# Patient Record
Sex: FEMALE | Race: BLACK OR AFRICAN AMERICAN | Employment: OTHER | URBAN - METROPOLITAN AREA
[De-identification: names, ages, dates, MRNs, and addresses within clinical notes are randomized per-mention and may not be internally consistent; named-entity substitution may affect disease eponyms.]

---

## 2017-03-08 PROBLEM — E11.9 TYPE 2 DIABETES MELLITUS WITHOUT COMPLICATION, WITHOUT LONG-TERM CURRENT USE OF INSULIN (HCC): Status: ACTIVE | Noted: 2017-03-08

## 2018-01-18 PROCEDURE — 84156 ASSAY OF PROTEIN URINE: CPT | Performed by: INTERNAL MEDICINE

## 2018-01-18 PROCEDURE — 82043 UR ALBUMIN QUANTITATIVE: CPT | Performed by: INTERNAL MEDICINE

## 2018-01-18 PROCEDURE — 36415 COLL VENOUS BLD VENIPUNCTURE: CPT | Performed by: INTERNAL MEDICINE

## 2018-01-18 PROCEDURE — 82570 ASSAY OF URINE CREATININE: CPT | Performed by: INTERNAL MEDICINE

## 2018-01-18 PROCEDURE — 81001 URINALYSIS AUTO W/SCOPE: CPT | Performed by: INTERNAL MEDICINE

## 2018-02-08 PROBLEM — E78.5 DYSLIPIDEMIA: Status: ACTIVE | Noted: 2018-02-08

## 2018-09-28 PROCEDURE — 82043 UR ALBUMIN QUANTITATIVE: CPT | Performed by: INTERNAL MEDICINE

## 2018-09-28 PROCEDURE — 82570 ASSAY OF URINE CREATININE: CPT | Performed by: INTERNAL MEDICINE

## 2018-11-05 PROBLEM — E11.9 TYPE 2 DIABETES MELLITUS WITHOUT COMPLICATION, WITHOUT LONG-TERM CURRENT USE OF INSULIN (HCC): Status: RESOLVED | Noted: 2017-03-08 | Resolved: 2018-11-05

## 2019-05-28 PROCEDURE — 81001 URINALYSIS AUTO W/SCOPE: CPT | Performed by: INTERNAL MEDICINE

## 2019-10-10 PROCEDURE — 87070 CULTURE OTHR SPECIMN AEROBIC: CPT | Performed by: ORTHOPAEDIC SURGERY

## 2019-10-10 PROCEDURE — 87186 SC STD MICRODIL/AGAR DIL: CPT | Performed by: ORTHOPAEDIC SURGERY

## 2019-10-10 PROCEDURE — 87075 CULTR BACTERIA EXCEPT BLOOD: CPT | Performed by: ORTHOPAEDIC SURGERY

## 2019-10-10 PROCEDURE — 87205 SMEAR GRAM STAIN: CPT | Performed by: ORTHOPAEDIC SURGERY

## 2019-10-10 PROCEDURE — 87076 CULTURE ANAEROBE IDENT EACH: CPT | Performed by: ORTHOPAEDIC SURGERY

## 2019-10-10 PROCEDURE — 87077 CULTURE AEROBIC IDENTIFY: CPT | Performed by: ORTHOPAEDIC SURGERY

## 2019-10-11 ENCOUNTER — HOSPITAL ENCOUNTER (INPATIENT)
Facility: HOSPITAL | Age: 64
LOS: 6 days | Discharge: NURSING, REHAB OR LONG-TERM CARE | DRG: 467 | End: 2019-10-17
Attending: EMERGENCY MEDICINE | Admitting: HOSPITALIST
Payer: COMMERCIAL

## 2019-10-11 ENCOUNTER — APPOINTMENT (OUTPATIENT)
Dept: GENERAL RADIOLOGY | Facility: HOSPITAL | Age: 64
DRG: 467 | End: 2019-10-11
Attending: ORTHOPAEDIC SURGERY
Payer: COMMERCIAL

## 2019-10-11 DIAGNOSIS — Z96.641 HISTORY OF TOTAL RIGHT HIP ARTHROPLASTY: ICD-10-CM

## 2019-10-11 DIAGNOSIS — T84.50XA: ICD-10-CM

## 2019-10-11 DIAGNOSIS — T81.40XA POSTOPERATIVE INFECTION, UNSPECIFIED TYPE, INITIAL ENCOUNTER: Primary | ICD-10-CM

## 2019-10-11 PROCEDURE — 96366 THER/PROPH/DIAG IV INF ADDON: CPT

## 2019-10-11 PROCEDURE — 87040 BLOOD CULTURE FOR BACTERIA: CPT | Performed by: EMERGENCY MEDICINE

## 2019-10-11 PROCEDURE — 73502 X-RAY EXAM HIP UNI 2-3 VIEWS: CPT | Performed by: ORTHOPAEDIC SURGERY

## 2019-10-11 PROCEDURE — 36415 COLL VENOUS BLD VENIPUNCTURE: CPT

## 2019-10-11 PROCEDURE — 82962 GLUCOSE BLOOD TEST: CPT

## 2019-10-11 PROCEDURE — 96365 THER/PROPH/DIAG IV INF INIT: CPT

## 2019-10-11 PROCEDURE — 85025 COMPLETE CBC W/AUTO DIFF WBC: CPT | Performed by: EMERGENCY MEDICINE

## 2019-10-11 PROCEDURE — 80048 BASIC METABOLIC PNL TOTAL CA: CPT | Performed by: EMERGENCY MEDICINE

## 2019-10-11 PROCEDURE — 85025 COMPLETE CBC W/AUTO DIFF WBC: CPT

## 2019-10-11 PROCEDURE — 99285 EMERGENCY DEPT VISIT HI MDM: CPT

## 2019-10-11 PROCEDURE — 96368 THER/DIAG CONCURRENT INF: CPT

## 2019-10-11 RX ORDER — DOCUSATE SODIUM 100 MG/1
100 CAPSULE, LIQUID FILLED ORAL 2 TIMES DAILY
Status: DISCONTINUED | OUTPATIENT
Start: 2019-10-11 | End: 2019-10-17

## 2019-10-11 RX ORDER — MORPHINE SULFATE 2 MG/ML
2 INJECTION, SOLUTION INTRAMUSCULAR; INTRAVENOUS EVERY 2 HOUR PRN
Status: DISCONTINUED | OUTPATIENT
Start: 2019-10-11 | End: 2019-10-13

## 2019-10-11 RX ORDER — MORPHINE SULFATE 2 MG/ML
1 INJECTION, SOLUTION INTRAMUSCULAR; INTRAVENOUS EVERY 2 HOUR PRN
Status: DISCONTINUED | OUTPATIENT
Start: 2019-10-11 | End: 2019-10-13

## 2019-10-11 RX ORDER — BISACODYL 10 MG
10 SUPPOSITORY, RECTAL RECTAL
Status: DISCONTINUED | OUTPATIENT
Start: 2019-10-11 | End: 2019-10-17

## 2019-10-11 RX ORDER — POLYETHYLENE GLYCOL 3350 17 G/17G
17 POWDER, FOR SOLUTION ORAL DAILY PRN
Status: DISCONTINUED | OUTPATIENT
Start: 2019-10-11 | End: 2019-10-17

## 2019-10-11 RX ORDER — ACETAMINOPHEN 325 MG/1
650 TABLET ORAL EVERY 6 HOURS PRN
Status: DISCONTINUED | OUTPATIENT
Start: 2019-10-11 | End: 2019-10-17

## 2019-10-11 RX ORDER — SODIUM CHLORIDE 0.9 % (FLUSH) 0.9 %
3 SYRINGE (ML) INJECTION AS NEEDED
Status: DISCONTINUED | OUTPATIENT
Start: 2019-10-11 | End: 2019-10-17

## 2019-10-11 RX ORDER — DEXTROSE MONOHYDRATE 25 G/50ML
50 INJECTION, SOLUTION INTRAVENOUS AS NEEDED
Status: DISCONTINUED | OUTPATIENT
Start: 2019-10-11 | End: 2019-10-17

## 2019-10-11 RX ORDER — SODIUM CHLORIDE 9 MG/ML
INJECTION, SOLUTION INTRAVENOUS CONTINUOUS
Status: DISCONTINUED | OUTPATIENT
Start: 2019-10-11 | End: 2019-10-14

## 2019-10-11 RX ORDER — ONDANSETRON 2 MG/ML
4 INJECTION INTRAMUSCULAR; INTRAVENOUS EVERY 6 HOURS PRN
Status: DISCONTINUED | OUTPATIENT
Start: 2019-10-11 | End: 2019-10-17

## 2019-10-11 RX ORDER — METOCLOPRAMIDE HYDROCHLORIDE 5 MG/ML
10 INJECTION INTRAMUSCULAR; INTRAVENOUS EVERY 8 HOURS PRN
Status: DISCONTINUED | OUTPATIENT
Start: 2019-10-11 | End: 2019-10-17

## 2019-10-11 RX ORDER — HYDROCODONE BITARTRATE AND ACETAMINOPHEN 10; 325 MG/1; MG/1
1 TABLET ORAL EVERY 4 HOURS PRN
Status: DISCONTINUED | OUTPATIENT
Start: 2019-10-11 | End: 2019-10-13

## 2019-10-11 NOTE — ED INITIAL ASSESSMENT (HPI)
Patient sent to ED for evaluation. Patient reports blood tests yesterday were abnormal and pmd was concerned for \"blood infection\". Patient had right hip surgery 2 weeks ago. Patient complains of feeling fatigue and weakness.

## 2019-10-11 NOTE — ED PROVIDER NOTES
Patient Seen in: [facility] AND [facility] Emergency Department      History   Patient presents with:  Abnormal Result (metabolic, cardiac)    Stated Complaint: post op hip surgery issue    HPI    59-year-old female with history of hypertension, diabetes, and st Resp 20   Temp 98.2 °F (36.8 °C)   Temp src Temporal   SpO2 99 %   O2 Device None (Room air)       Current:/66   Pulse 109   Temp 98.2 °F (36.8 °C) (Temporal)   Resp 16   Ht 167.6 cm (5' 6\")   Wt 78.9 kg   LMP  (LMP Unknown)   SpO2 99%   BMI 28.08 result                 Please view results for these tests on the individual orders.    URINALYSIS WITH CULTURE REFLEX   RAINBOW DRAW BLUE   RAINBOW DRAW LAVENDER   RAINBOW DRAW LIGHT GREEN   RAINBOW DRAW GOLD   BLOOD CULTURE   BLOOD CULTURE

## 2019-10-12 ENCOUNTER — APPOINTMENT (OUTPATIENT)
Dept: GENERAL RADIOLOGY | Facility: HOSPITAL | Age: 64
DRG: 467 | End: 2019-10-12
Attending: HOSPITALIST
Payer: COMMERCIAL

## 2019-10-12 ENCOUNTER — APPOINTMENT (OUTPATIENT)
Dept: GENERAL RADIOLOGY | Facility: HOSPITAL | Age: 64
DRG: 467 | End: 2019-10-12
Attending: EMERGENCY MEDICINE
Payer: COMMERCIAL

## 2019-10-12 PROCEDURE — 80053 COMPREHEN METABOLIC PANEL: CPT | Performed by: HOSPITALIST

## 2019-10-12 PROCEDURE — 93005 ELECTROCARDIOGRAM TRACING: CPT

## 2019-10-12 PROCEDURE — 97110 THERAPEUTIC EXERCISES: CPT

## 2019-10-12 PROCEDURE — 87077 CULTURE AEROBIC IDENTIFY: CPT | Performed by: ORTHOPAEDIC SURGERY

## 2019-10-12 PROCEDURE — 89050 BODY FLUID CELL COUNT: CPT | Performed by: ORTHOPAEDIC SURGERY

## 2019-10-12 PROCEDURE — 77002 NEEDLE LOCALIZATION BY XRAY: CPT | Performed by: EMERGENCY MEDICINE

## 2019-10-12 PROCEDURE — 89051 BODY FLUID CELL COUNT: CPT | Performed by: ORTHOPAEDIC SURGERY

## 2019-10-12 PROCEDURE — 83036 HEMOGLOBIN GLYCOSYLATED A1C: CPT | Performed by: HOSPITALIST

## 2019-10-12 PROCEDURE — 97162 PT EVAL MOD COMPLEX 30 MIN: CPT

## 2019-10-12 PROCEDURE — 20610 DRAIN/INJ JOINT/BURSA W/O US: CPT | Performed by: EMERGENCY MEDICINE

## 2019-10-12 PROCEDURE — 87186 SC STD MICRODIL/AGAR DIL: CPT | Performed by: ORTHOPAEDIC SURGERY

## 2019-10-12 PROCEDURE — 0S993ZX DRAINAGE OF RIGHT HIP JOINT, PERCUTANEOUS APPROACH, DIAGNOSTIC: ICD-10-PCS | Performed by: RADIOLOGY

## 2019-10-12 PROCEDURE — 89060 EXAM SYNOVIAL FLUID CRYSTALS: CPT | Performed by: ORTHOPAEDIC SURGERY

## 2019-10-12 PROCEDURE — 93010 ELECTROCARDIOGRAM REPORT: CPT | Performed by: HOSPITALIST

## 2019-10-12 PROCEDURE — 81001 URINALYSIS AUTO W/SCOPE: CPT | Performed by: HOSPITALIST

## 2019-10-12 PROCEDURE — 87184 SC STD DISK METHOD PER PLATE: CPT | Performed by: ORTHOPAEDIC SURGERY

## 2019-10-12 PROCEDURE — 87086 URINE CULTURE/COLONY COUNT: CPT | Performed by: HOSPITALIST

## 2019-10-12 PROCEDURE — 87205 SMEAR GRAM STAIN: CPT | Performed by: ORTHOPAEDIC SURGERY

## 2019-10-12 PROCEDURE — 82962 GLUCOSE BLOOD TEST: CPT

## 2019-10-12 PROCEDURE — 71045 X-RAY EXAM CHEST 1 VIEW: CPT | Performed by: HOSPITALIST

## 2019-10-12 PROCEDURE — 85025 COMPLETE CBC W/AUTO DIFF WBC: CPT | Performed by: HOSPITALIST

## 2019-10-12 PROCEDURE — 87070 CULTURE OTHR SPECIMN AEROBIC: CPT | Performed by: ORTHOPAEDIC SURGERY

## 2019-10-12 RX ORDER — LIDOCAINE HYDROCHLORIDE 10 MG/ML
INJECTION, SOLUTION EPIDURAL; INFILTRATION; INTRACAUDAL; PERINEURAL
Status: COMPLETED
Start: 2019-10-12 | End: 2019-10-12

## 2019-10-12 RX ORDER — ATORVASTATIN CALCIUM 20 MG/1
20 TABLET, FILM COATED ORAL DAILY
Status: DISCONTINUED | OUTPATIENT
Start: 2019-10-12 | End: 2019-10-17

## 2019-10-12 RX ORDER — HEPARIN SODIUM 5000 [USP'U]/ML
5000 INJECTION, SOLUTION INTRAVENOUS; SUBCUTANEOUS ONCE
Status: COMPLETED | OUTPATIENT
Start: 2019-10-12 | End: 2019-10-12

## 2019-10-12 RX ORDER — LIDOCAINE HYDROCHLORIDE 10 MG/ML
5 INJECTION, SOLUTION EPIDURAL; INFILTRATION; INTRACAUDAL; PERINEURAL ONCE
Status: DISCONTINUED | OUTPATIENT
Start: 2019-10-12 | End: 2019-10-17

## 2019-10-12 NOTE — CONSULTS
[facility]    PATIENT'S NAME: HAN PRABHAKAR   ATTENDING PHYSICIAN: Martita Stanley MD   CONSULTING PHYSICIAN: Richard Smith.  Katherine Ochoa MD   PATIENT ACCOUNT#:   [id number]    LOCATION:  [facility] [address] #:   [id number]       DATE OF BIRTH: count 9.2, hemoglobin 9.4, platelets 052. BUN 23 and creatinine 1.03. Blood cultures have been sent. IMPRESSION:  A postoperative complication with an infected seroma and hematoma. We await sensitivities from the serratia and IR re-evaluation.   Ant

## 2019-10-12 NOTE — PROGRESS NOTES
[facility] AND [facility] Infectious Disease Progress Note    Jh Blanco Patient Status:  Inpatient    3/7/1955 MRN [id number]   Location [facility] 4W/SW/SE Attending Jorge Sheldon MD   Hosp Day # 1 PCP Kayode Brunson MD     Subjective:  Pt with resp. rate 16, height 5' 6\" (1.676 m), weight 175 lb 3.2 oz (79.5 kg), SpO2 99 %, not currently breastfeeding. Temp (24hrs), Av.2 °F (36.8 °C), Min:98 °F (36.7 °C), Max:98.4 °F (36.9 °C)      HEENT: Exam is unremarkable. Without scleral icterus.   Lauren Rosa

## 2019-10-12 NOTE — CM/SW NOTE
Received MDO for dc plan. Spoke with patient for assessment. Patient lives in an apartment, 3 stairs to enter, with her , her mom & grandson. She has several other family members (including son) who live in the building. Patient uses a walker & RTS.

## 2019-10-12 NOTE — CONSULTS
Margaret Michael is a 62year old female.    Patient presents with:  Abnormal Result (metabolic, cardiac)      HPI:    Hip replaced at [facility] now closed  Started to drain and aspirated twice  Sent here for admission  Night sweats but no temps    REVIEW OF S History:   Procedure Laterality Date   • HYSTERECTOMY     • TOTAL HIP REPLACEMENT Left 06/06/2019    dr Jeanne Mullins   • TOTAL HIP REPLACEMENT Right 09/19/2019    Dr Kemi Mendes history and family history negative related to present illness except a Chloride 97 (L) 98 - 112 mmol/L    CO2 28.0 21.0 - 32.0 mmol/L    Anion Gap 8 0 - 18 mmol/L    BUN 23 (H) 7 - 18 mg/dL    Creatinine 1.03 (H) 0.55 - 1.02 mg/dL    BUN/CREA Ratio 22.3 (H) 10.0 - 20.0    Calcium, Total 9.1 8.5 - 10.1 mg/dL    Calculated Osmo

## 2019-10-12 NOTE — ED NOTES
The patient ambulated to the bathroom with walker effectively with stand-by assist. The patient had a large, brown, formed bowel movement with no urine output.

## 2019-10-12 NOTE — PROGRESS NOTES
[facility] AND [facility]  Progress Note    Tova Craig Patient Status:  Inpatient    3/7/1955 MRN [id number]   Location [facility] 4W/SW/SE Attending Kody Dos Santos MD   Hosp Day # 1 PCP Darrion Miranda MD     SUBJECTIVE:  Interval History: Patient has n

## 2019-10-12 NOTE — H&P
[facility] Hospitalist H&P     CC: Patient presents with:  Abnormal Result (metabolic, cardiac)       PCP: Jia Alanis MD      Assessment and Plan       Lito Barlow is a 62year old female with PMH sig for type 2 DM, HTN and recent right HECTOR 1 month ago at OSH History:   Procedure Laterality Date   • HYSTERECTOMY     • TOTAL HIP REPLACEMENT Left 06/06/2019    dr Adrianna Hendricks   • TOTAL HIP REPLACEMENT Right 09/19/2019    Dr Adrianna Hendricks        ALL:  No Known Allergies     Home Medications:    No outpatient medications ha

## 2019-10-12 NOTE — H&P
[facility]: Adult Reconstruction Clinic Note    CC:  Concern for R hip PJI S/P R HECTOR    HPI: Miguel Villareal is a 62year oldfemale who presents today c/o right hip pain s/p R HECTOR  done 9.29.2019 by Dr. Clark Callahan at [facility].   Her init Tobacco Use      Smoking status: Former Smoker      Smokeless tobacco: Never Used    Alcohol use: No      Alcohol/week: 0.0 standard drinks    Drug use: No      PHYSICAL EXAM:  Estimated body mass index is 28.08 kg/m² as calculated from the following:    H DRAW LIGHT GREEN; Standing  -     RAINBOW DRAW GOLD; Standing  -     URINALYSIS WITH CULTURE REFLEX; Standing  -     CBC WITH DIFFERENTIAL WITH PLATELET; Standing  -     BASIC METABOLIC PANEL (8); Standing  -     CBC W/ DIFFERENTIAL; Standing  -     IP CON suppository 10 mg  -     ondansetron HCl (ZOFRAN) injection 4 mg  -     Metoclopramide HCl (REGLAN) injection 10 mg  -     morphINE sulfate (PF) 4 MG/ML injection 1 mg  -     morphINE sulfate (PF) 4 MG/ML injection 2 mg  -     INITIATE ALGORITHM FOR HYPOGL

## 2019-10-12 NOTE — PROGRESS NOTES
[facility] Hospitalist Progress Note     PCP: Gurpreet Lua MD    CC: Follow up       Assessment/Plan:     Jeff To is a 62year old female with PMH sig for type 2 DM, HTN and recent right HECTOR 1 month ago at OSH who presented from home due to [facility] in culture  pain is controlled,  No CP, SOB, or N/V.       Objective     OBJECTIVE:  Temp:  [98 °F (36.7 °C)-98.4 °F (36.9 °C)] 98 °F (36.7 °C)  Pulse:  [] 96  Resp:  [12-20] 16  BP: ()/(50-70) 107/69    Intake/Output:    Intake/Output Summary (Last 24 hour in the last 168 hours. Imaging:Xr Hip W Or Wo Pelvis 2 Or 3 Views, Right (cpt=73502)    Result Date: 10/11/2019  CONCLUSION:   Right hip soft tissue swelling with multiple areas of subcutaneous gas.   Patient had a seroma aspiration performed recently wh

## 2019-10-12 NOTE — PLAN OF CARE
Problem: Diabetes/Glucose Control  Goal: Glucose maintained within prescribed range  Description  INTERVENTIONS:  - Monitor Blood Glucose as ordered  - Assess for signs and symptoms of hyperglycemia and hypoglycemia  - Administer ordered medications to m Consider cultural and social influences on pain and pain management  - Manage/alleviate anxiety  - Utilize distraction and/or relaxation techniques  - Monitor for opioid side effects  - Notify MD/LIP if interventions unsuccessful or patient reports new nayeli if the patient needs post-hospital services based on physician/LIP order or complex needs related to functional status, cognitive ability or social support system  Outcome: Progressing   Patient had large purulent drainage from right hip incision.  Dry dres

## 2019-10-12 NOTE — PROGRESS NOTES
[facility] Dosing Service    Initial Pharmacokinetic Consult for Vancomycin Dosing     Julieth Elizabeth is a 62year old female who is being treated for infected seroma. Pharmacy has been asked to dose Vancomycin by Dr. Eugenio Gonzalez    She has No Known Allergies.

## 2019-10-12 NOTE — ED NOTES
Patient is safe to transport to floor per MD. Report given to floor RN, Maria De Jesus L[place] [phone]. Transport via cart requested.

## 2019-10-13 ENCOUNTER — ANESTHESIA (OUTPATIENT)
Dept: SURGERY | Facility: HOSPITAL | Age: 64
DRG: 467 | End: 2019-10-13
Payer: COMMERCIAL

## 2019-10-13 ENCOUNTER — APPOINTMENT (OUTPATIENT)
Dept: GENERAL RADIOLOGY | Facility: HOSPITAL | Age: 64
DRG: 467 | End: 2019-10-13
Attending: ORTHOPAEDIC SURGERY
Payer: COMMERCIAL

## 2019-10-13 ENCOUNTER — ANESTHESIA EVENT (OUTPATIENT)
Dept: SURGERY | Facility: HOSPITAL | Age: 64
DRG: 467 | End: 2019-10-13
Payer: COMMERCIAL

## 2019-10-13 PROCEDURE — 87186 SC STD MICRODIL/AGAR DIL: CPT | Performed by: ORTHOPAEDIC SURGERY

## 2019-10-13 PROCEDURE — 87077 CULTURE AEROBIC IDENTIFY: CPT | Performed by: ORTHOPAEDIC SURGERY

## 2019-10-13 PROCEDURE — 86900 BLOOD TYPING SEROLOGIC ABO: CPT | Performed by: ORTHOPAEDIC SURGERY

## 2019-10-13 PROCEDURE — 86901 BLOOD TYPING SEROLOGIC RH(D): CPT | Performed by: ORTHOPAEDIC SURGERY

## 2019-10-13 PROCEDURE — 73552 X-RAY EXAM OF FEMUR 2/>: CPT | Performed by: ORTHOPAEDIC SURGERY

## 2019-10-13 PROCEDURE — 82962 GLUCOSE BLOOD TEST: CPT

## 2019-10-13 PROCEDURE — 87070 CULTURE OTHR SPECIMN AEROBIC: CPT | Performed by: ORTHOPAEDIC SURGERY

## 2019-10-13 PROCEDURE — 30233N1 TRANSFUSION OF NONAUTOLOGOUS RED BLOOD CELLS INTO PERIPHERAL VEIN, PERCUTANEOUS APPROACH: ICD-10-PCS | Performed by: INTERNAL MEDICINE

## 2019-10-13 PROCEDURE — 87176 TISSUE HOMOGENIZATION CULTR: CPT | Performed by: ORTHOPAEDIC SURGERY

## 2019-10-13 PROCEDURE — 80048 BASIC METABOLIC PNL TOTAL CA: CPT | Performed by: HOSPITALIST

## 2019-10-13 PROCEDURE — 87205 SMEAR GRAM STAIN: CPT | Performed by: ORTHOPAEDIC SURGERY

## 2019-10-13 PROCEDURE — 86850 RBC ANTIBODY SCREEN: CPT | Performed by: ORTHOPAEDIC SURGERY

## 2019-10-13 PROCEDURE — 87076 CULTURE ANAEROBE IDENT EACH: CPT | Performed by: ORTHOPAEDIC SURGERY

## 2019-10-13 PROCEDURE — 80202 ASSAY OF VANCOMYCIN: CPT | Performed by: HOSPITALIST

## 2019-10-13 PROCEDURE — 88300 SURGICAL PATH GROSS: CPT | Performed by: ORTHOPAEDIC SURGERY

## 2019-10-13 PROCEDURE — 36430 TRANSFUSION BLD/BLD COMPNT: CPT | Performed by: ANESTHESIOLOGY

## 2019-10-13 PROCEDURE — 87075 CULTR BACTERIA EXCEPT BLOOD: CPT | Performed by: ORTHOPAEDIC SURGERY

## 2019-10-13 PROCEDURE — 72170 X-RAY EXAM OF PELVIS: CPT | Performed by: ORTHOPAEDIC SURGERY

## 2019-10-13 PROCEDURE — 85025 COMPLETE CBC W/AUTO DIFF WBC: CPT | Performed by: HOSPITALIST

## 2019-10-13 PROCEDURE — 86920 COMPATIBILITY TEST SPIN: CPT

## 2019-10-13 PROCEDURE — 85610 PROTHROMBIN TIME: CPT | Performed by: HOSPITALIST

## 2019-10-13 DEVICE — ARTICUL/EZE FEMORAL HEAD DIAMETER 32MM +9 12/14 TAPER
Type: IMPLANTABLE DEVICE | Site: HIP | Status: FUNCTIONAL
Brand: ARTICUL/EZE

## 2019-10-13 DEVICE — CEMENTRALIZER STEM CENTRALIZER 12.0MM CEMENTED
Type: IMPLANTABLE DEVICE | Site: HIP | Status: FUNCTIONAL
Brand: CEMENTRALIZER

## 2019-10-13 DEVICE — KIT FEM BONE CEMENT RESTRICTOR: Type: IMPLANTABLE DEVICE | Site: HIP | Status: FUNCTIONAL

## 2019-10-13 DEVICE — CABLE READY ASSEMBLY 1.8X635: Type: IMPLANTABLE DEVICE | Site: HIP | Status: FUNCTIONAL

## 2019-10-13 DEVICE — STIMULAN® RAPID CURE PROVIDED STERILE FOR SINGLE PATIENT USE. STIMULAN® RAPID CURE CONTAINS CALCIUM SULFATE POWDER AND MIXING SOLUTION IN PRE-MEASURED QUANTITIES SO THAT WHEN MIXED TOGETHER IN A STERILE MIXING BOWL, THE RESULTANT PASTE IS TO BE DIGITALLY PACKED INTO OPEN BONE VOID/GAP TO SET INSITU OR PLACED INTO THE MOULD PROVIDED, THE MIXTURE SETS TO FORM BEADS. THE BIODEGRADABLE, RADIOPAQUE BEADS ARE RESORBED IN APPROXIMATELY 30 – 60 DAYS WHEN USED IN ACCORDANCE WITH THE DEVICE LABELLING. STIMULAN® RAPID CURE IS MANUFACTURED FROM SYNTHETIC IMPLANT GRADE CALCIUM SULFATE DIHYDRATE(CASO4.2H2O) THAT RESORBS AND IS REPLACED WITH BONE DURING THE HEALING PROCESS. ALSO, AS THE BONE VOID FILLER BEADS ARE BIODEGRADABLE AND BIOCOMPATIBLE, THEY MAY BE USED AT AN INFECTED SITE.
Type: IMPLANTABLE DEVICE | Site: HIP | Status: FUNCTIONAL
Brand: STIMULAN® RAPID CURE

## 2019-10-13 DEVICE — IMPLANTABLE DEVICE: Type: IMPLANTABLE DEVICE | Site: HIP | Status: FUNCTIONAL

## 2019-10-13 DEVICE — SMARTSET GHV GENTAMICIN HIGH VISCOSITY BONE CEMENT 40G
Type: IMPLANTABLE DEVICE | Site: HIP | Status: FUNCTIONAL
Brand: SMARTSET

## 2019-10-13 RX ORDER — LIDOCAINE HYDROCHLORIDE 10 MG/ML
INJECTION, SOLUTION EPIDURAL; INFILTRATION; INTRACAUDAL; PERINEURAL AS NEEDED
Status: DISCONTINUED | OUTPATIENT
Start: 2019-10-13 | End: 2019-10-13 | Stop reason: SURG

## 2019-10-13 RX ORDER — TOBRAMYCIN 1.2 G/30ML
INJECTION, POWDER, LYOPHILIZED, FOR SOLUTION INTRAVENOUS AS NEEDED
Status: DISCONTINUED | OUTPATIENT
Start: 2019-10-13 | End: 2019-10-13 | Stop reason: HOSPADM

## 2019-10-13 RX ORDER — DEXTROSE MONOHYDRATE 25 G/50ML
50 INJECTION, SOLUTION INTRAVENOUS
Status: DISCONTINUED | OUTPATIENT
Start: 2019-10-13 | End: 2019-10-13 | Stop reason: HOSPADM

## 2019-10-13 RX ORDER — SODIUM CHLORIDE, SODIUM LACTATE, POTASSIUM CHLORIDE, CALCIUM CHLORIDE 600; 310; 30; 20 MG/100ML; MG/100ML; MG/100ML; MG/100ML
INJECTION, SOLUTION INTRAVENOUS CONTINUOUS
Status: DISCONTINUED | OUTPATIENT
Start: 2019-10-13 | End: 2019-10-13 | Stop reason: HOSPADM

## 2019-10-13 RX ORDER — ONDANSETRON 2 MG/ML
INJECTION INTRAMUSCULAR; INTRAVENOUS AS NEEDED
Status: DISCONTINUED | OUTPATIENT
Start: 2019-10-13 | End: 2019-10-13 | Stop reason: SURG

## 2019-10-13 RX ORDER — HYDROCODONE BITARTRATE AND ACETAMINOPHEN 10; 325 MG/1; MG/1
2 TABLET ORAL EVERY 4 HOURS PRN
Status: DISCONTINUED | OUTPATIENT
Start: 2019-10-13 | End: 2019-10-17

## 2019-10-13 RX ORDER — HYDROCODONE BITARTRATE AND ACETAMINOPHEN 5; 325 MG/1; MG/1
2 TABLET ORAL AS NEEDED
Status: DISCONTINUED | OUTPATIENT
Start: 2019-10-13 | End: 2019-10-13 | Stop reason: HOSPADM

## 2019-10-13 RX ORDER — MORPHINE SULFATE 4 MG/ML
4 INJECTION, SOLUTION INTRAMUSCULAR; INTRAVENOUS EVERY 2 HOUR PRN
Status: DISCONTINUED | OUTPATIENT
Start: 2019-10-13 | End: 2019-10-17

## 2019-10-13 RX ORDER — HYDROMORPHONE HYDROCHLORIDE 1 MG/ML
0.4 INJECTION, SOLUTION INTRAMUSCULAR; INTRAVENOUS; SUBCUTANEOUS EVERY 5 MIN PRN
Status: DISCONTINUED | OUTPATIENT
Start: 2019-10-13 | End: 2019-10-13 | Stop reason: HOSPADM

## 2019-10-13 RX ORDER — HYDROCODONE BITARTRATE AND ACETAMINOPHEN 5; 325 MG/1; MG/1
1 TABLET ORAL AS NEEDED
Status: DISCONTINUED | OUTPATIENT
Start: 2019-10-13 | End: 2019-10-13 | Stop reason: HOSPADM

## 2019-10-13 RX ORDER — MORPHINE SULFATE 4 MG/ML
4 INJECTION, SOLUTION INTRAMUSCULAR; INTRAVENOUS EVERY 10 MIN PRN
Status: DISCONTINUED | OUTPATIENT
Start: 2019-10-13 | End: 2019-10-13 | Stop reason: HOSPADM

## 2019-10-13 RX ORDER — GLYCOPYRROLATE 0.2 MG/ML
INJECTION INTRAMUSCULAR; INTRAVENOUS AS NEEDED
Status: DISCONTINUED | OUTPATIENT
Start: 2019-10-13 | End: 2019-10-13 | Stop reason: SURG

## 2019-10-13 RX ORDER — DIPHENHYDRAMINE HCL 25 MG
25 CAPSULE ORAL EVERY 6 HOURS PRN
Status: DISCONTINUED | OUTPATIENT
Start: 2019-10-13 | End: 2019-10-17

## 2019-10-13 RX ORDER — HALOPERIDOL 5 MG/ML
0.25 INJECTION INTRAMUSCULAR ONCE AS NEEDED
Status: DISCONTINUED | OUTPATIENT
Start: 2019-10-13 | End: 2019-10-13 | Stop reason: HOSPADM

## 2019-10-13 RX ORDER — HYDROMORPHONE HYDROCHLORIDE 1 MG/ML
0.6 INJECTION, SOLUTION INTRAMUSCULAR; INTRAVENOUS; SUBCUTANEOUS EVERY 5 MIN PRN
Status: DISCONTINUED | OUTPATIENT
Start: 2019-10-13 | End: 2019-10-13 | Stop reason: HOSPADM

## 2019-10-13 RX ORDER — NALOXONE HYDROCHLORIDE 0.4 MG/ML
80 INJECTION, SOLUTION INTRAMUSCULAR; INTRAVENOUS; SUBCUTANEOUS AS NEEDED
Status: DISCONTINUED | OUTPATIENT
Start: 2019-10-13 | End: 2019-10-13 | Stop reason: HOSPADM

## 2019-10-13 RX ORDER — ONDANSETRON 2 MG/ML
4 INJECTION INTRAMUSCULAR; INTRAVENOUS ONCE AS NEEDED
Status: DISCONTINUED | OUTPATIENT
Start: 2019-10-13 | End: 2019-10-13 | Stop reason: HOSPADM

## 2019-10-13 RX ORDER — ENOXAPARIN SODIUM 100 MG/ML
40 INJECTION SUBCUTANEOUS DAILY
Status: DISCONTINUED | OUTPATIENT
Start: 2019-10-14 | End: 2019-10-13

## 2019-10-13 RX ORDER — PHENYLEPHRINE HCL 10 MG/ML
VIAL (ML) INJECTION AS NEEDED
Status: DISCONTINUED | OUTPATIENT
Start: 2019-10-13 | End: 2019-10-13 | Stop reason: SURG

## 2019-10-13 RX ORDER — VANCOMYCIN HYDROCHLORIDE 500 MG/10ML
INJECTION, POWDER, LYOPHILIZED, FOR SOLUTION INTRAVENOUS CONTINUOUS PRN
Status: DISCONTINUED | OUTPATIENT
Start: 2019-10-13 | End: 2019-10-13 | Stop reason: HOSPADM

## 2019-10-13 RX ORDER — ENOXAPARIN SODIUM 100 MG/ML
40 INJECTION SUBCUTANEOUS DAILY
Status: DISCONTINUED | OUTPATIENT
Start: 2019-10-14 | End: 2019-10-17

## 2019-10-13 RX ORDER — HYDROCODONE BITARTRATE AND ACETAMINOPHEN 10; 325 MG/1; MG/1
1 TABLET ORAL EVERY 4 HOURS PRN
Status: DISCONTINUED | OUTPATIENT
Start: 2019-10-13 | End: 2019-10-17

## 2019-10-13 RX ORDER — MORPHINE SULFATE 2 MG/ML
2 INJECTION, SOLUTION INTRAMUSCULAR; INTRAVENOUS EVERY 2 HOUR PRN
Status: DISCONTINUED | OUTPATIENT
Start: 2019-10-13 | End: 2019-10-17

## 2019-10-13 RX ORDER — CEFAZOLIN SODIUM 1 G/3ML
INJECTION, POWDER, FOR SOLUTION INTRAMUSCULAR; INTRAVENOUS AS NEEDED
Status: DISCONTINUED | OUTPATIENT
Start: 2019-10-13 | End: 2019-10-13 | Stop reason: SURG

## 2019-10-13 RX ORDER — DIPHENHYDRAMINE HYDROCHLORIDE 50 MG/ML
INJECTION INTRAMUSCULAR; INTRAVENOUS
Status: COMPLETED
Start: 2019-10-13 | End: 2019-10-13

## 2019-10-13 RX ORDER — PROCHLORPERAZINE EDISYLATE 5 MG/ML
5 INJECTION INTRAMUSCULAR; INTRAVENOUS ONCE AS NEEDED
Status: DISCONTINUED | OUTPATIENT
Start: 2019-10-13 | End: 2019-10-13 | Stop reason: HOSPADM

## 2019-10-13 RX ORDER — NEOSTIGMINE METHYLSULFATE 0.5 MG/ML
INJECTION INTRAVENOUS AS NEEDED
Status: DISCONTINUED | OUTPATIENT
Start: 2019-10-13 | End: 2019-10-13 | Stop reason: SURG

## 2019-10-13 RX ORDER — MORPHINE SULFATE 10 MG/ML
6 INJECTION, SOLUTION INTRAMUSCULAR; INTRAVENOUS EVERY 10 MIN PRN
Status: DISCONTINUED | OUTPATIENT
Start: 2019-10-13 | End: 2019-10-13 | Stop reason: HOSPADM

## 2019-10-13 RX ORDER — ROCURONIUM BROMIDE 10 MG/ML
INJECTION, SOLUTION INTRAVENOUS AS NEEDED
Status: DISCONTINUED | OUTPATIENT
Start: 2019-10-13 | End: 2019-10-13 | Stop reason: SURG

## 2019-10-13 RX ORDER — HYDROMORPHONE HYDROCHLORIDE 1 MG/ML
0.2 INJECTION, SOLUTION INTRAMUSCULAR; INTRAVENOUS; SUBCUTANEOUS EVERY 5 MIN PRN
Status: DISCONTINUED | OUTPATIENT
Start: 2019-10-13 | End: 2019-10-13 | Stop reason: HOSPADM

## 2019-10-13 RX ORDER — MORPHINE SULFATE 4 MG/ML
2 INJECTION, SOLUTION INTRAMUSCULAR; INTRAVENOUS EVERY 10 MIN PRN
Status: DISCONTINUED | OUTPATIENT
Start: 2019-10-13 | End: 2019-10-13 | Stop reason: HOSPADM

## 2019-10-13 RX ORDER — SODIUM CHLORIDE 9 MG/ML
INJECTION, SOLUTION INTRAVENOUS CONTINUOUS PRN
Status: DISCONTINUED | OUTPATIENT
Start: 2019-10-13 | End: 2019-10-13 | Stop reason: SURG

## 2019-10-13 RX ADMIN — SODIUM CHLORIDE: 9 INJECTION, SOLUTION INTRAVENOUS at 17:10:00

## 2019-10-13 RX ADMIN — SODIUM CHLORIDE: 9 INJECTION, SOLUTION INTRAVENOUS at 15:58:00

## 2019-10-13 RX ADMIN — SODIUM CHLORIDE: 9 INJECTION, SOLUTION INTRAVENOUS at 14:01:00

## 2019-10-13 RX ADMIN — PHENYLEPHRINE HCL 50 MCG: 10 MG/ML VIAL (ML) INJECTION at 16:42:00

## 2019-10-13 RX ADMIN — PHENYLEPHRINE HCL 50 MCG: 10 MG/ML VIAL (ML) INJECTION at 17:06:00

## 2019-10-13 RX ADMIN — SODIUM CHLORIDE: 9 INJECTION, SOLUTION INTRAVENOUS at 15:37:00

## 2019-10-13 RX ADMIN — PHENYLEPHRINE HCL 50 MCG: 10 MG/ML VIAL (ML) INJECTION at 17:11:00

## 2019-10-13 RX ADMIN — PHENYLEPHRINE HCL 50 MCG: 10 MG/ML VIAL (ML) INJECTION at 16:07:00

## 2019-10-13 RX ADMIN — SODIUM CHLORIDE: 9 INJECTION, SOLUTION INTRAVENOUS at 13:46:00

## 2019-10-13 RX ADMIN — PHENYLEPHRINE HCL 50 MCG: 10 MG/ML VIAL (ML) INJECTION at 15:52:00

## 2019-10-13 RX ADMIN — GLYCOPYRROLATE 0.6 MG: 0.2 INJECTION INTRAMUSCULAR; INTRAVENOUS at 18:07:00

## 2019-10-13 RX ADMIN — NEOSTIGMINE METHYLSULFATE 3 MG: 0.5 INJECTION INTRAVENOUS at 18:07:00

## 2019-10-13 RX ADMIN — ROCURONIUM BROMIDE 40 MG: 10 INJECTION, SOLUTION INTRAVENOUS at 13:49:00

## 2019-10-13 RX ADMIN — PHENYLEPHRINE HCL 50 MCG: 10 MG/ML VIAL (ML) INJECTION at 16:20:00

## 2019-10-13 RX ADMIN — PHENYLEPHRINE HCL 50 MCG: 10 MG/ML VIAL (ML) INJECTION at 16:47:00

## 2019-10-13 RX ADMIN — CEFAZOLIN SODIUM 2 G: 1 INJECTION, POWDER, FOR SOLUTION INTRAMUSCULAR; INTRAVENOUS at 17:45:00

## 2019-10-13 RX ADMIN — SODIUM CHLORIDE: 9 INJECTION, SOLUTION INTRAVENOUS at 18:19:00

## 2019-10-13 RX ADMIN — PHENYLEPHRINE HCL 50 MCG: 10 MG/ML VIAL (ML) INJECTION at 16:13:00

## 2019-10-13 RX ADMIN — ONDANSETRON 4 MG: 2 INJECTION INTRAMUSCULAR; INTRAVENOUS at 17:22:00

## 2019-10-13 RX ADMIN — SODIUM CHLORIDE: 9 INJECTION, SOLUTION INTRAVENOUS at 15:57:00

## 2019-10-13 RX ADMIN — CEFAZOLIN SODIUM 2 G: 1 INJECTION, POWDER, FOR SOLUTION INTRAMUSCULAR; INTRAVENOUS at 14:06:00

## 2019-10-13 RX ADMIN — PHENYLEPHRINE HCL 50 MCG: 10 MG/ML VIAL (ML) INJECTION at 16:45:00

## 2019-10-13 RX ADMIN — PHENYLEPHRINE HCL 50 MCG: 10 MG/ML VIAL (ML) INJECTION at 17:21:00

## 2019-10-13 RX ADMIN — ROCURONIUM BROMIDE 10 MG: 10 INJECTION, SOLUTION INTRAVENOUS at 13:31:00

## 2019-10-13 RX ADMIN — LIDOCAINE HYDROCHLORIDE 50 MG: 10 INJECTION, SOLUTION EPIDURAL; INFILTRATION; INTRACAUDAL; PERINEURAL at 13:49:00

## 2019-10-13 RX ADMIN — PHENYLEPHRINE HCL 50 MCG: 10 MG/ML VIAL (ML) INJECTION at 16:27:00

## 2019-10-13 NOTE — PLAN OF CARE
Comments: Pt is alert, calls appropriately for assistance. VSS. Sats well on R/A. Voiding of urine, getting OOB x1a/walker/WBAT. Dressing assessed @ HS and remains intact - will change as needed. Sera and Ava grey per order.  NPO at 00:00 for planned white choose  - Honor patient and family perspectives and choices  Outcome: Progressing     Problem: PAIN - ADULT  Goal: Verbalizes/displays adequate comfort level or patient's stated pain goal  Description  INTERVENTIONS:  - Encourage pt to monitor pain and req discharge planning  - Arrange for needed discharge resources and transportation as appropriate  - Identify discharge learning needs (meds, wound care, etc)  - Arrange for interpreters to assist at discharge as needed  - Consider post-discharge preferences

## 2019-10-13 NOTE — OCCUPATIONAL THERAPY NOTE
OT orders rcvd; patient scheduled for surgery 10/13- will need new orders post-op and updated activity and WB status if applicable; pt did have posterior HECTOR 9/19 and is aware and adheres to precautions; will continue to follow.      Sergio Schmid, OTR/L

## 2019-10-13 NOTE — BRIEF OP NOTE
Pre-Operative Diagnosis: Infection of total joint prosthesis ([facility] [address] [T84.50XA]     Post-Operative Diagnosis: Infection of total joint prosthesis ([facility] [address] [T84.50XA]      Procedure Performed:   Procedure(s):  RIGHT HIP INCISION AND DRAINAGE, EXPLANTATION OF ALL

## 2019-10-13 NOTE — PROGRESS NOTES
[facility] AND CLINICS  Progress Note    Estrellita Zhang Patient Status:  Inpatient    3/7/1955 MRN [id number]   Location [facility] 4W/SW/SE Attending Daija Barone MD   Hosp Day # 2 PCP Adam Nuñez MD     SUBJECTIVE:  Interval History: Patient has n

## 2019-10-13 NOTE — ANESTHESIA PROCEDURE NOTES
Airway  Urgency: elective    Airway not difficult    General Information and Staff    Patient location during procedure: OR  Anesthesiologist: Shola Many, DO  Performed: anesthesiologist     Indications and Patient Condition  Indications for airway raymundo

## 2019-10-13 NOTE — ANESTHESIA PREPROCEDURE EVALUATION
Anesthesia PreOp Note    HPI:     Racquel Tsang is a 62year old female who presents for preoperative consultation requested by: Hortensia Aranda MD    Date of Surgery: 10/11/2019 - 10/13/2019    Procedure(s):  HIP OPEN REDUCTION INTERNAL FIXATION  Indicat Disp: 90 capsule, Rfl: 0, 10/10/2019 at 0800  MetFORMIN HCl  MG Oral Tablet 24 Hr, Take two tablets twice a day with meals.  (Patient taking differently: Take 500 mg by mouth daily with breakfast. Take two tablets twice a day with meals. ), Disp: 360 infusion, , , Continuous PRN, Kiana Alejandre DO  [facility] Hold] atorvastatin (LIPITOR) tab 20 mg, 20 mg, Oral, Daily, Hayder Zapata MD  [facility] Hold] lidocaine PF (XYLOCAINE) 1% injection, 5 mL, Other, Once, Hayder Zapata MD  [facility] Hold] Normal Saline Flus (MERREM) 500 mg in sodium chloride 0.9% 100 mL MBP, 500 mg, Intravenous, Q8H, [place] Dickerson MD, Last Rate: 33.3 mL/hr at 10/13/19 0411, 500 mg at 10/13/19 1428  Vancomycin HCl (VANCOCIN) 750 mg in sodium chloride 0.9% 250 mL IVPB, 750 mg, Intravenous, Q file    Other Topics      Concerns:        Not on file    Social History Narrative      Not on file      Available pre-op labs reviewed.   Lab Results   Component Value Date    WBC 6.4 10/13/2019    WBC 8.84 10/10/2019    RBC 2.70 (L) 10/13/2019    RBC 3.37 plan, major risks and alternatives with the patient and answered all questions. The patient desires to proceed with surgery and anesthesia as planned. HBG low and coags abnormal sx and patient aware and accept transfusion.   Informed Consent Plan and Risk

## 2019-10-13 NOTE — PROGRESS NOTES
[facility] dosing service    Follow-up Pharmacokinetic Consult for Vancomycin Dosing     Tova Craig is a 62year old female who is being treated for infected seroma . Patient is on day 2 of Vancomycin and is currently receiving 750 mg IV Q 12 hours. care.    Gonzales Schilling, PharmD  10/13/2019  6:04 AM  [place] [facility]: [phone]

## 2019-10-13 NOTE — PHYSICAL THERAPY NOTE
Attempted Treatment:  Pts chart reviewed. Per RN Hiram Wright, pt is going for OR for her hip revision today. PT will require new PT order, WB status, precautions s/p.

## 2019-10-13 NOTE — PROGRESS NOTES
[facility] Hospitalist Progress Note     PCP: Rommel Dozier MD    CC: Follow up       Assessment/Plan:     Donell Hobson is a 62year old female with PMH sig for type 2 DM, HTN and recent right HECTOR 1 month ago at OSH who presented from home due to [facility] in culture  Edenilson Escobar MD 10/13/19  [facility] Hospitalist     Answering Service number: [phone]    Subjective     States pain is controlled,  Feeling pretty good today. No LH, no dizziness. No CP, SOB, or N/V.       Objective     OBJECTIVE:  Temp:  [98.4 °F (36.9 °C)-99 sulfate, morphINE sulfate, morphINE sulfate (PF), Atropine Sulfate, ondansetron HCl, Prochlorperazine Edisylate, haloperidol lactate, Naloxone HCl, [MAR Hold] Normal Saline Flush, [MAR Hold] acetaminophen, [MAR Hold] PEG 3350, [MAR Hold] magnesium hydroxid 10/11/2019 at 20:36     Approved by (CST): Nikunj Brennan MD on 10/11/2019 at 20:38          Xr Aspir/inj Major Joint W/fluoro Rt(cpt=77002/74722)    Result Date: 10/12/2019  CONCLUSION:  1. Fluoroscopically guided right hip aspiration.   7 mL of fluid were s

## 2019-10-13 NOTE — ANESTHESIA POSTPROCEDURE EVALUATION
Patient: Georgie Koroma    Procedure Summary     Date:  10/13/19 Room / Location:  [facility] OR  / [facility] OR    Anesthesia Start:  2022 Anesthesia Stop:  9560    Procedure:  HIP OPEN REDUCTION INTERNAL FIXATION (Right ) Diagnosis:       Infection of total

## 2019-10-14 ENCOUNTER — APPOINTMENT (OUTPATIENT)
Dept: PICC SERVICES | Facility: HOSPITAL | Age: 64
DRG: 467 | End: 2019-10-14
Attending: INTERNAL MEDICINE
Payer: COMMERCIAL

## 2019-10-14 PROCEDURE — 85025 COMPLETE CBC W/AUTO DIFF WBC: CPT | Performed by: HOSPITALIST

## 2019-10-14 PROCEDURE — 97168 OT RE-EVAL EST PLAN CARE: CPT

## 2019-10-14 PROCEDURE — 36573 INSJ PICC RS&I 5 YR+: CPT

## 2019-10-14 PROCEDURE — 97530 THERAPEUTIC ACTIVITIES: CPT

## 2019-10-14 PROCEDURE — 80048 BASIC METABOLIC PNL TOTAL CA: CPT | Performed by: HOSPITALIST

## 2019-10-14 PROCEDURE — 02HV33Z INSERTION OF INFUSION DEVICE INTO SUPERIOR VENA CAVA, PERCUTANEOUS APPROACH: ICD-10-PCS | Performed by: INTERNAL MEDICINE

## 2019-10-14 PROCEDURE — 97164 PT RE-EVAL EST PLAN CARE: CPT

## 2019-10-14 PROCEDURE — 4A02X4A MEASUREMENT OF CARDIAC ELECTRICAL ACTIVITY, GUIDANCE, EXTERNAL APPROACH: ICD-10-PCS | Performed by: INTERNAL MEDICINE

## 2019-10-14 PROCEDURE — 82962 GLUCOSE BLOOD TEST: CPT

## 2019-10-14 RX ORDER — SODIUM CHLORIDE 0.9 % (FLUSH) 0.9 %
10 SYRINGE (ML) INJECTION AS NEEDED
Status: DISCONTINUED | OUTPATIENT
Start: 2019-10-14 | End: 2019-10-17

## 2019-10-14 RX ORDER — LIDOCAINE HYDROCHLORIDE 10 MG/ML
0.5 INJECTION, SOLUTION INFILTRATION; PERINEURAL ONCE AS NEEDED
Status: ACTIVE | OUTPATIENT
Start: 2019-10-14 | End: 2019-10-14

## 2019-10-14 NOTE — PHYSICAL THERAPY NOTE
PHYSICAL THERAPY HIP EVALUATION - INPATIENT     Room Number: 436/436-A  Evaluation Date: 10/14/2019  Type of Evaluation: Initial  Physician Order: PT Eval and Treat    Presenting Problem: post op infection s/p I&D and spacer placement, R ORIF  Reason for T Lane aware of patient status and plan post session. Patient will benefit from continued IP PT services to address these deficits in preparation for discharge.     DISCHARGE RECOMMENDATIONS  PT Discharge Recommendations: Sub-acute rehabilitation    PLAN ASSESSMENT  Rating: Unable to rate  Location: right hip and lower extremity  Management Techniques: Activity promotion; Body mechanics;Repositioning    COGNITION  · Overall Cognitive Status:  WFL - within functional limits    RANGE OF MOTION AND STRENGTH AS questions and concerns addressed    CURRENT GOALS    Goals to be met by: 10/22/19  Patient Goal Patient's self-stated goal is: to go to rehab   Goal #1 Patient is able to demonstrate supine - sit EOB @ level: modified independent   Goal #1   Current Status

## 2019-10-14 NOTE — PROGRESS NOTES
Margaret Michael is a 62year old female. Patient presents with:  Abnormal Result (metabolic, cardiac)      HPI:    Much postop pain noted    REVIEW OF SYSTEMS:   A comprehensive 11 point review of systems was completed.   Pertinent positives and negatives not call [facility] at [phone].          Lab Results   Component Value Date    WBC 6.4 10/13/2019    HGB 7.9 10/13/2019    HCT 24.4 10/13/2019    .0 10/13/2019    CREATSERUM 0.57 10/13/2019    BUN 14 10/13/2019     10/13/2019    K 3.8 10/13/2019 10.0 - 20.0    Calcium, Total 8.3 (L) 8.5 - 10.1 mg/dL    Calculated Osmolality 285 275 - 295 mOsm/kg    GFR, Non- 96 >=60    GFR, -American 110 >=60    ALT 12 (L) 13 - 56 U/L    AST 13 (L) 15 - 37 U/L    Alkaline Phosphatase 90 50 - 27.0 21.0 - 32.0 mmol/L    Anion Gap 5 0 - 18 mmol/L    BUN 14 7 - 18 mg/dL    Creatinine 0.57 0.55 - 1.02 mg/dL    BUN/CREA Ratio 24.6 (H) 10.0 - 20.0    Calcium, Total 8.1 (L) 8.5 - 10.1 mg/dL    Calculated Osmolality 289 275 - 295 mOsm/kg    GFR, Non-Af Hold Gold Auto Resulted    BLOOD CULTURE   Result Value Ref Range    Blood Culture Result No Growth 2 Days    BLOOD CULTURE   Result Value Ref Range    Blood Culture Result No Growth 2 Days    BODY FLUID CULT,AEROBIC AND ANAEROBIC   Result Value Ref Ran Value Ref Range    WBC 7.5 4.0 - 11.0 x10(3) uL    RBC 2.70 (L) 3.80 - 5.30 x10(6)uL    HGB 7.9 (L) 12.0 - 16.0 g/dL    HCT 24.4 (L) 35.0 - 48.0 %    MCV 90.4 80.0 - 100.0 fL    MCH 29.3 26.0 - 34.0 pg    MCHC 32.4 31.0 - 37.0 g/dL    RDW-SD 46.7 (H) 35.1

## 2019-10-14 NOTE — CONSULTS
[facility] AND CLINICS  Progress Note    Julieth Gravely Patient Status:  Inpatient    3/7/1955 MRN [id number]   Location [facility] 4W/SW/SE Attending Vimal Michaud MD   Hosp Day # 3 PCP Terri Rush MD     SUBJECTIVE:  Interval History: Patient has no cur

## 2019-10-14 NOTE — PROGRESS NOTES
[facility] AND [facility] Infectious Disease  Progress Note    Sidney Gamez Patient Status:  Inpatient    3/7/1955 MRN [id number]   Location [facility] 4W/SW/SE Attending Sarah Wyatt MD   Hosp Day # 3 PCP Eula Quach MD     Subjective:  Patient seen cultures. Post-op pain control ongoing. Anticipate PICC and 6 weeks of IV invanz (serratia can produce inducible beta lactamases) until 11/25/19. Trending temps and WBCs. Eventual 2nd stage procedure and reimplantation of hardware anticipated.   D/w pat

## 2019-10-14 NOTE — PLAN OF CARE
Comments: Pt is alert, calls appropriately for assistance. VSS. Sats well on R/A. Hendrix patent. Will be NWB to RLE when OOB with strict anterior hip precautions. Mepilex over sites are c/d/I;  - pt afebrile. Drain sponge with  serosang drainage observed.  H patient/family to participate in care and decision-making at the level they choose  - Honor patient and family perspectives and choices  Outcome: Progressing     Problem: PAIN - ADULT  Goal: Verbalizes/displays adequate comfort level or patient's stated pa discharge w/pt and caregiver  - Include patient/family/discharge partner in discharge planning  - Arrange for needed discharge resources and transportation as appropriate  - Identify discharge learning needs (meds, wound care, etc)  - Arrange for interpret

## 2019-10-14 NOTE — CM/SW NOTE
SW received call from RN who states PT and OT are recommending rehab for DC. SW met with pt to provide SNF list. Pt states she is considering finding a SNF near her home at [facility]. She is unable to recall what facilities are near her.  SW will need

## 2019-10-14 NOTE — PROGRESS NOTES
[facility] Hospitalist Progress Note     PCP: Wendy Jacobo MD    CC: Follow up       Assessment/Plan:     Cass Muniz is a 62year old female with PMH sig for type 2 DM, HTN and recent right HECTOR 1 month ago at OSH who presented from home due to [facility] in culture  nondistended, no organomegaly, bowel sounds present   right hip s/p HECTOR, pain to palp, erythema, mild warmth   Psych: mood stable, cooperative  Neuro: No focal deficits    Medications     • enoxaparin  40 mg Subcutaneous Daily   • atorvastatin  20 mg Oral Xr Femur Min 2 Views Right (cpt=73552)    Result Date: 10/13/2019  CONCLUSION:  BONES: A nondisplaced longitudinal fracture in the proximal right femoral shaft paralleling the removed femoral component.   Cerclage cables are being placed around the fem were sent for laboratory analysis. No immediate complication.     Dictated by (CST): Dayan Gonzalez MD on 10/12/2019 at 15:35     Approved by (CST): Dayan Gonzalez MD on 10/12/2019 at 15:38

## 2019-10-14 NOTE — PLAN OF CARE
Patient resting in bed. Hendrix removed this afternoon, check void. PICC line inserted for IV antibiotics. Patient very anxious about getting up/moving, blood sugar management, and bathroom use.  Pain managed through PO PRN pain medications throughout the day perspectives and choices  Outcome: Progressing     Problem: PAIN - ADULT  Goal: Verbalizes/displays adequate comfort level or patient's stated pain goal  Description  INTERVENTIONS:  - Encourage pt to monitor pain and request assistance  - Assess pain usin needed discharge resources and transportation as appropriate  - Identify discharge learning needs (meds, wound care, etc)  - Arrange for interpreters to assist at discharge as needed  - Consider post-discharge preferences of patient/family/discharge partne

## 2019-10-14 NOTE — PLAN OF CARE
Problem: Diabetes/Glucose Control  Goal: Glucose maintained within prescribed range  Description  INTERVENTIONS:  - Monitor Blood Glucose as ordered  - Assess for signs and symptoms of hyperglycemia and hypoglycemia  - Administer ordered medications to m evaluate response  - Consider cultural and social influences on pain and pain management  - Manage/alleviate anxiety  - Utilize distraction and/or relaxation techniques  - Monitor for opioid side effects  - Notify MD/LIP if interventions unsuccessful or pa discharge planning if the patient needs post-hospital services based on physician/LIP order or complex needs related to functional status, cognitive ability or social support system  Outcome: Progressing   Went to OR at 1245 PM. Has not been back yet.  Chayito Mary

## 2019-10-14 NOTE — OCCUPATIONAL THERAPY NOTE
OCCUPATIONAL THERAPY EVALUATION - INPATIENT      Room Number: 436/436-A  Evaluation Date: 10/14/2019  Type of Evaluation: Initial  Presenting Problem: (postop infection )    Physician Order: IP Consult to Occupational Therapy  Reason for Therapy: ADL/IADL TBD    PLAN  OT Treatment Plan: Balance activities; Energy conservation/work simplification techniques;ADL training;Functional transfer training; Endurance training;Patient/Family education;Patient/Family training;Equipment eval/education; Compensatory techni extremity strength is within functional limits    ACTIVITIES OF DAILY LIVING ASSESSMENT  AM-PAC ‘6-Clicks’ Inpatient Daily Activity Short Form  How much help from another person does the patient currently need…  -   Putting on and taking off regular lower 10/21/19  Frequency:3-5x week    Zaida BROWN/L  NUNEZ [facility]   #[phone]

## 2019-10-15 PROCEDURE — 0SR9029 REPLACEMENT OF RIGHT HIP JOINT WITH METAL ON POLYETHYLENE SYNTHETIC SUBSTITUTE, CEMENTED, OPEN APPROACH: ICD-10-PCS | Performed by: ORTHOPAEDIC SURGERY

## 2019-10-15 PROCEDURE — 97116 GAIT TRAINING THERAPY: CPT

## 2019-10-15 PROCEDURE — 0SP90JZ REMOVAL OF SYNTHETIC SUBSTITUTE FROM RIGHT HIP JOINT, OPEN APPROACH: ICD-10-PCS | Performed by: ORTHOPAEDIC SURGERY

## 2019-10-15 PROCEDURE — 82962 GLUCOSE BLOOD TEST: CPT

## 2019-10-15 PROCEDURE — 97530 THERAPEUTIC ACTIVITIES: CPT

## 2019-10-15 PROCEDURE — 0QS604Z REPOSITION RIGHT UPPER FEMUR WITH INTERNAL FIXATION DEVICE, OPEN APPROACH: ICD-10-PCS | Performed by: ORTHOPAEDIC SURGERY

## 2019-10-15 PROCEDURE — 97110 THERAPEUTIC EXERCISES: CPT

## 2019-10-15 PROCEDURE — 3E0U029 INTRODUCTION OF OTHER ANTI-INFECTIVE INTO JOINTS, OPEN APPROACH: ICD-10-PCS | Performed by: ORTHOPAEDIC SURGERY

## 2019-10-15 NOTE — CM/SW NOTE
10/15: SW followed up with patient regarding preference for [facility] options and to send referrals. Pt has requested referrals be sent locally, #1 choice: [facility], [facility], [facility]. [facility]ristian sending referrals via [facility]. DON requested.          5pm Adde

## 2019-10-15 NOTE — OCCUPATIONAL THERAPY NOTE
OCCUPATIONAL THERAPY TREATMENT NOTE - INPATIENT    Room Number: 436/436-A         Presenting Problem: (postop infection )     Problem List  Principal Problem:    Postoperative infection, unspecified type, initial encounter  Active Problems:    Postoperativ Restriction: R lower extremity        R Lower Extremity: Non-Weight Bearing       PAIN ASSESSMENT  Ratin  Location: R hip  Management Techniques:  Activity promotion;Repositioning     ACTIVITY TOLERANCE  Pulse: 116  Heart Rate Source: Monitor recall 3 of 3 posterior hip precautions  Comment: Progressing      Comment:            Goals  on: 10/21/19  Frequency:3-5x week    Rajesh Fraser MA, OTR/L  Occupational Therapist

## 2019-10-15 NOTE — PHYSICAL THERAPY NOTE
PHYSICAL THERAPY HIP TREATMENT NOTE - INPATIENT    Room Number: 436/436-A            Presenting Problem: post op infection s/p I&D and spacer placement, R ORIF    Problem List  Principal Problem:    Postoperative infection, unspecified type, initial encoun Extremity: Non-Weight Bearing       PAIN ASSESSMENT   Ratin  Location: right hip  Management Techniques: Relaxation;Repositioning    BALANCE  Static Sitting: Good  Dynamic Sitting: Good  Static Standing: Poor +  Dynamic Standing: Poor  ACTIVITY TOLERAN self-stated goal is: to go to rehab   Goal #1 Patient is able to demonstrate supine - sit EOB @ level: modified independent   Goal #1   Current Status  Pt demo with max assist x 2, post hip prec maintained   Goal #2 Patient is able to demonstrate transfers

## 2019-10-15 NOTE — PROGRESS NOTES
[facility] AND [facility] Infectious Disease  Progress Note    Day Dotson Patient Status:  Inpatient    3/7/1955 MRN [id number]   Location [facility] 4W/SW/SE Attending Sanjiv Shah MD   Hosp Day # 4 PCP Homa Garcia MD     Subjective:  Patient seen examination and coordination of today's plan of care. [facility] Infectious Disease  [phone]    10/15/2019  10:25 AM

## 2019-10-15 NOTE — PROGRESS NOTES
[facility] Hospitalist Progress Note     PCP: Eze Storey MD    CC: Follow up       Assessment/Plan:     Miguel Villareal is a 62year old female with PMH sig for type 2 DM, HTN and recent right HECTOR 1 month ago at OSH who presented from home due to [facility] in culture  nondistended, no organomegaly, bowel sounds present   right hip s/p HECTOR, pain to palp, erythema, mild warmth   Psych: mood stable, cooperative  Neuro: No focal deficits    Medications     • enoxaparin  40 mg Subcutaneous Daily   • atorvastatin  20 mg Oral 2 Views Right (cpt=73552)    Result Date: 10/13/2019  CONCLUSION:  BONES: A nondisplaced longitudinal fracture in the proximal right femoral shaft paralleling the removed femoral component.   Cerclage cables are being placed around the femoral fracture  Ace

## 2019-10-15 NOTE — PROGRESS NOTES
[facility] AND [facility]  Progress Note    William Moses Patient Status:  Inpatient    3/7/1955 MRN [id number]   Location [facility] 4W/SW/SE Attending Sandoval MD Vijay   Hosp Day # 4 PCP Gamal Soto MD     POD #2    SUBJECTIVE:  Interval History: Ashish Lu

## 2019-10-15 NOTE — OPERATIVE REPORT
Patient Name:  Zeinab Castellon  MRN:  [id number]  :  3/07/1955  DATE OF SURGERY:  10/13/2019     SURGEON:  Chaz Moe MD     PREOPERATIVE DIAGNOSIS:  Right hip periprosthetic joint infection      POSTOPERATIVE DIAGNOSIS: Right hip periprosthetic j soft tissues had scarred to the femur. The area of the sciatic nerve was protected throughout the procedure. A posterior approach to the hip was carefully completed by detaching the scarred external rotators.  There was little posterior capsule from t start of the procedure, minimally displaced nature, and inability to properly seat the trial stem due to her right canal and bow we used the stem formed at the beginning of the case.     The acetabular component was opened on the back table and cemented int

## 2019-10-16 PROCEDURE — 97116 GAIT TRAINING THERAPY: CPT

## 2019-10-16 PROCEDURE — 82962 GLUCOSE BLOOD TEST: CPT

## 2019-10-16 PROCEDURE — 97530 THERAPEUTIC ACTIVITIES: CPT

## 2019-10-16 PROCEDURE — 97535 SELF CARE MNGMENT TRAINING: CPT

## 2019-10-16 PROCEDURE — 97110 THERAPEUTIC EXERCISES: CPT

## 2019-10-16 RX ORDER — HYDROCODONE BITARTRATE AND ACETAMINOPHEN 10; 325 MG/1; MG/1
1-2 TABLET ORAL EVERY 4 HOURS PRN
Qty: 60 TABLET | Refills: 0 | Status: SHIPPED | OUTPATIENT
Start: 2019-10-16 | End: 2019-12-30 | Stop reason: ALTCHOICE

## 2019-10-16 RX ORDER — PSEUDOEPHEDRINE HCL 30 MG
100 TABLET ORAL 2 TIMES DAILY
Qty: 30 CAPSULE | Refills: 0 | Status: SHIPPED
Start: 2019-10-16 | End: 2019-12-27

## 2019-10-16 RX ORDER — ENOXAPARIN SODIUM 100 MG/ML
40 INJECTION SUBCUTANEOUS DAILY
Qty: 18 SYRINGE | Refills: 0 | Status: SHIPPED
Start: 2019-10-17 | End: 2019-11-04

## 2019-10-16 NOTE — CM/SW NOTE
SRI followed up with [facility] for bed availibility and insurance authorization. SRI spoke with Zachariah Nava, hospital liaison for [address] at [phone] who states that [facility] does have a bed for the pt and they will be starting insurance authorization.      SRI/AMA

## 2019-10-16 NOTE — PROGRESS NOTES
[facility] AND [facility] Infectious Disease  Progress Note    Louisa Johnson Patient Status:  Inpatient    3/7/1955 MRN [id number]   Location [facility] 4W/SW/SE Attending Fermin Hutton MD   Hosp Day # 5 PCP Toni Ngo MD     Subjective:  Patient seen spent at patient's bedside today in examination and coordination of today's plan of care.     [facility] Infectious Disease  [phone]    10/16/2019  12:08 PM

## 2019-10-16 NOTE — OCCUPATIONAL THERAPY NOTE
OCCUPATIONAL THERAPY TREATMENT NOTE - INPATIENT    Room Number: 436/436-A         Presenting Problem: (postop infection )     Problem List  Principal Problem:    Postoperative infection, unspecified type, initial encounter  Active Problems:    Postoperativ LIVING ASSESSMENT  AM-PAC ‘6-Clicks’ Inpatient Daily Activity Short Form  How much help from another person does the patient currently need…  -   Putting on and taking off regular lower body clothing?: A Lot  -   Bathing (including washing, rinsing, drying

## 2019-10-16 NOTE — PLAN OF CARE
Problem: Diabetes/Glucose Control  Goal: Glucose maintained within prescribed range  Description  INTERVENTIONS:  - Monitor Blood Glucose as ordered  - Assess for signs and symptoms of hyperglycemia and hypoglycemia  - Administer ordered medications to m severity of pain and evaluate response  - Implement non-pharmacological measures as appropriate and evaluate response  - Consider cultural and social influences on pain and pain management  - Manage/alleviate anxiety  - Utilize distraction and/or relaxatio be responsible for managing their own health  - Refer to Case Management Department for coordinating discharge planning if the patient needs post-hospital services based on physician/LIP order or complex needs related to functional status, cognitive abilit

## 2019-10-16 NOTE — PHYSICAL THERAPY NOTE
PHYSICAL THERAPY HIP TREATMENT NOTE - INPATIENT    Room Number: 436/436-A            Presenting Problem: post op infection s/p I&D and spacer placement, R ORIF    Problem List  Principal Problem:    Postoperative infection, unspecified type, initial encoun Non-Weight Bearing       PAIN ASSESSMENT   Ratin  Location: right hip  Management Techniques: Relaxation;Repositioning    BALANCE  Static Sitting: Good  Dynamic Sitting: Good  Static Standing: Poor +  Dynamic Standing: Poor             AM-PAC '6-Clicks max/mod assist x 2, post hip prec maintained   Goal #2 Patient is able to demonstrate transfers Sit to/from Stand at assistance level: modified independent      Goal #2  Current Status  Pt demo with rw and mod assist x 2, nwb rt le, 70 degs flexion maintai

## 2019-10-16 NOTE — PROGRESS NOTES
[facility] Hospitalist Progress Note     PCP: Terri Rush MD    CC: Follow up       Assessment/Plan:     Julieth Elizabeth is a 62year old female with PMH sig for type 2 DM, HTN and recent right HECTOR 1 month ago at OSH who presented from home due to [facility] in culture  right hip s/p HECTOR, pain to palp, erythema, mild warmth   Psych: mood stable, cooperative  Neuro: No focal deficits    Medications     • enoxaparin  40 mg Subcutaneous Daily   • atorvastatin  20 mg Oral Daily   • lidocaine (PF)  5 mL Other Once   • docusate (srs=[id number])    Result Date: 10/13/2019  CONCLUSION:  BONES: A nondisplaced longitudinal fracture in the proximal right femoral shaft paralleling the removed femoral component.   Cerclage cables are being placed around the femoral fracture  Acetabular compon

## 2019-10-16 NOTE — PROGRESS NOTES
[facility]  Progress Note    William Moses Patient Status:  Inpatient    3/7/1955 MRN [id number]   Location [facility] 4W/SW/SE Attending Jaime Linares MD   Hosp Day # 5 PCP Gamal Soto MD     POD #3    SUBJECTIVE:  Interval History: [facility]

## 2019-10-17 VITALS
DIASTOLIC BLOOD PRESSURE: 94 MMHG | OXYGEN SATURATION: 97 % | RESPIRATION RATE: 18 BRPM | SYSTOLIC BLOOD PRESSURE: 126 MMHG | BODY MASS INDEX: 28.15 KG/M2 | TEMPERATURE: 98 F | HEART RATE: 90 BPM | WEIGHT: 175.19 LBS | HEIGHT: 66 IN

## 2019-10-17 PROCEDURE — 82962 GLUCOSE BLOOD TEST: CPT

## 2019-10-17 PROCEDURE — 97535 SELF CARE MNGMENT TRAINING: CPT

## 2019-10-17 PROCEDURE — 97530 THERAPEUTIC ACTIVITIES: CPT

## 2019-10-17 NOTE — CM/SW NOTE
Spoke with Jacky Singh NP for ID, and he stated that he will change the ertapenem back to meropenem per note and discussion with [employer] yesterday. Jacky Singh will change the med rec shortly.

## 2019-10-17 NOTE — PHYSICAL THERAPY NOTE
PHYSICAL THERAPY TREATMENT NOTE - INPATIENT     Room Number: 436/436-A       Presenting Problem: post op infection s/p I&D R hip, ORIF    Problem List  Principal Problem:    Postoperative infection, unspecified type, initial encounter  Active Problems: promotion;Relaxation;Repositioning    BALANCE                                                                                                                     Static Sitting: Fair +  Dynamic Sitting: Fair           Static Standing: Poor +  Dynamic Stand independent    Goal #3   Current Status SPT to/from rolling commode, Mod A Of 2   Goal #4     Goal #4   Current Status     Goal #5 Patient verbalizes and/or demonstrates all precautions and safety concerns independently   Goal #5   Current Status  in progr

## 2019-10-17 NOTE — PLAN OF CARE
Pt suppose to go to [facility] and report given to nurse dunn. Non wt bearing on rt lower extermity also no bending not more than 70 degree.  Post hip precaution also mentioned to her. Elaine Seaman was late and she refused to go by Elaine Seaman and was changed to a

## 2019-10-17 NOTE — CM/SW NOTE
SW received call from RN who stated pt is ready for discharge today. SW called and spoke with Felix Estevez, hospital liaison for [facility] at [phone] to arrange a time for discharge.  [facility] can accept the pt today at 3:30PM. RN is aware of discharge perico

## 2019-10-17 NOTE — OCCUPATIONAL THERAPY NOTE
OCCUPATIONAL THERAPY TREATMENT NOTE - INPATIENT    Room Number: 436/436-A         Presenting Problem: (postop infection )     Problem List  Principal Problem:    Postoperative infection, unspecified type, initial encounter  Active Problems:    Postoperativ RESTRICTION  Weight Bearing Restriction: R lower extremity        R Lower Extremity: Non-Weight Bearing       PAIN ASSESSMENT  Rating: Unable to rate  Location: (RLE)  Management Techniques: Relaxation;Repositioning     ACTIVITY TOLERANCE assist, discussed use of LH AE     Patient will complete toilet transfer with min A   Comment: mod assist x2, with use of rolling commode chair, 70 degree bend limitation on RLE, NWB RLE.     Patient will independently recall 3 of 3 posterior hip precautio

## 2019-10-17 NOTE — PLAN OF CARE
Problem: Diabetes/Glucose Control  Goal: Glucose maintained within prescribed range  Description  INTERVENTIONS:  - Monitor Blood Glucose as ordered  - Assess for signs and symptoms of hyperglycemia and hypoglycemia  - Administer ordered medications to m risk of injury  - Provide assistive devices as appropriate  - Consider OT/PT consult to assist with strengthening/mobility  - Encourage toileting schedule  Outcome: Adequate for Discharge     Problem: DISCHARGE PLANNING  Goal: Discharge to home or other fa

## 2019-10-17 NOTE — DISCHARGE SUMMARY
[facility] Hospitalist Discharge Summary   Patient ID:  Racquel Tsang [id number]  62year old 3/7/1955    Admit date: 10/11/2019  Discharge date: 10/17/2019  Risk of Readmission Lace+ Score: 40  59-90 High Risk  29-58 Medium Risk  0-28   Low Risk    Primary Care Ph GENERAL: no apparent distress, overweight  NEURO: A/A Ox3  RESP: non labored, CTA  CARDIO: Regular, no murmur  ABD: soft, NT, ND, BS+  EXTREMITIES: no edema, no calf tenderness, SCD in place    Operative Procedures: Procedure(s) (LRB):  HIP OPEN REDUCTION Commonly known as:  NORCO     HYDROcodone-acetaminophen 7.5-325 MG Tabs  Commonly known as:  NORCO  Replaced by:  HYDROcodone-acetaminophen  MG Tabs     Meloxicam 15 MG Tabs     XARELTO 2.5 MG Tabs  Generic drug:  rivaroxaban           Where to Get Y

## 2019-10-17 NOTE — PROGRESS NOTES
[facility] AND [facility] Infectious Disease Progress Note    Tova Craig Patient Status:  Inpatient    3/7/1955 MRN [id number]   Location [facility] 4W/SW/SE Attending Kody Dos Santos MD   Hosp Day # 6 PCP Darrion Miranda MD     Subjective:  Pt with PRN  •  Insulin Aspart Pen (NOVOLOG) 100 UNIT/ML flexpen 1-5 Units, 1-5 Units, Subcutaneous, TID CC  •  Meropenem (MERREM) 500 mg in sodium chloride 0.9% 100 mL MBP, 500 mg, Intravenous, Q8H    Physical Exam:  General: Alert, orientated x3. Cooperative. [phone].      YURIY VAZQUEZ NP  10/12/2019  10:25 AM

## 2019-10-17 NOTE — PLAN OF CARE
Pt is POD #3-4. Vital signs within normal limits. PRN Norco provided for pain. Alert and oriented x4. CMS intact. On room air. Tolerating carb controlled diet. Voids freely. Dressing clean, dry, and intact. Max assist. NWB.  Lovenox and SCDs for DVT prophyl Encourage patient/family to participate in care and decision-making at the level they choose  - Honor patient and family perspectives and choices  Outcome: Progressing     Problem: PAIN - ADULT  Goal: Verbalizes/displays adequate comfort level or patient's barriers to discharge w/pt and caregiver  - Include patient/family/discharge partner in discharge planning  - Arrange for needed discharge resources and transportation as appropriate  - Identify discharge learning needs (meds, wound care, etc)  - Arrange f

## 2019-10-18 ENCOUNTER — SNF ADMIT/H&P (OUTPATIENT)
Dept: INTERNAL MEDICINE CLINIC | Facility: SKILLED NURSING FACILITY | Age: 64
End: 2019-10-18

## 2019-10-18 DIAGNOSIS — K59.00 CONSTIPATION, UNSPECIFIED CONSTIPATION TYPE: ICD-10-CM

## 2019-10-18 DIAGNOSIS — E11.69 TYPE 2 DIABETES MELLITUS WITH OTHER SPECIFIED COMPLICATION, WITHOUT LONG-TERM CURRENT USE OF INSULIN (HCC): ICD-10-CM

## 2019-10-18 DIAGNOSIS — R53.1 WEAKNESS: ICD-10-CM

## 2019-10-18 DIAGNOSIS — T84.50XD INFECTION OF PROSTHETIC JOINT, SUBSEQUENT ENCOUNTER: ICD-10-CM

## 2019-10-18 DIAGNOSIS — I10 ESSENTIAL HYPERTENSION: ICD-10-CM

## 2019-10-18 PROCEDURE — 99310 SBSQ NF CARE HIGH MDM 45: CPT | Performed by: NURSE PRACTITIONER

## 2019-10-18 NOTE — PROGRESS NOTES
HPI: Zach Gutierrez  Is a 63 yo female with PMH significant for type 2 DM, HTN and recent right HECTOR 1 month ago at OSH who presented from home to [address] [place] [address] due to [address] in culture drawn in clinic with DR SCHULTZ [facility]. S/p washout/spacer 10/13/19 by Dr María Farmer.  OP and OR dry  WOUND: R hip incision with intact sutures, moderate amount of light pink drainage on dressing   NEUROLOGIC: intact; no sensorimotor deficit, follows commands  PSYCHIATRIC: alert and oriented x 3; affect appropriate    ASSESSMENT/PLAN  PT/OT, f/u orhto

## 2019-10-22 ENCOUNTER — SNF VISIT (OUTPATIENT)
Dept: INTERNAL MEDICINE CLINIC | Facility: SKILLED NURSING FACILITY | Age: 64
End: 2019-10-22

## 2019-10-22 DIAGNOSIS — T84.50XD INFECTION OF PROSTHETIC JOINT, SUBSEQUENT ENCOUNTER: ICD-10-CM

## 2019-10-22 DIAGNOSIS — R53.1 WEAKNESS: ICD-10-CM

## 2019-10-22 DIAGNOSIS — Z96.649 S/P REVISION OF TOTAL HIP: ICD-10-CM

## 2019-10-22 PROCEDURE — 99308 SBSQ NF CARE LOW MDM 20: CPT | Performed by: NURSE PRACTITIONER

## 2019-10-22 NOTE — PROGRESS NOTES
HPI: Estrellita Zhang  Is a 61 yo female with PMH significant for type 2 DM, HTN and recent right HECTOR 1 month ago at OSH who presented from home to [address] [place] [address] due to [address] in culture drawn in clinic with DR SCHULTZ [facility]. S/p washout/spacer 10/13/19 by Dr Ally Thomason.  OP and OR incision not observed today, in therapy   NEUROLOGIC: intact; no sensorimotor deficit, follows commands  PSYCHIATRIC: alert and oriented x 3; affect appropriate    ASSESSMENT/PLAN  Cont pt/ot, f/u Dr Ulises Quinones 10/29  10/21 cbc cmp stable sed rate 79 (126 10

## 2019-10-25 ENCOUNTER — SNF VISIT (OUTPATIENT)
Dept: INTERNAL MEDICINE CLINIC | Facility: SKILLED NURSING FACILITY | Age: 64
End: 2019-10-25

## 2019-10-25 DIAGNOSIS — T84.50XD INFECTION OF PROSTHETIC JOINT, SUBSEQUENT ENCOUNTER: ICD-10-CM

## 2019-10-25 DIAGNOSIS — E11.9 TYPE 2 DIABETES MELLITUS WITHOUT COMPLICATION, WITHOUT LONG-TERM CURRENT USE OF INSULIN (HCC): ICD-10-CM

## 2019-10-25 PROCEDURE — 99308 SBSQ NF CARE LOW MDM 20: CPT | Performed by: NURSE PRACTITIONER

## 2019-10-25 NOTE — PROGRESS NOTES
HPI: Miguel Villareal  Is a 63 yo female with PMH significant for type 2 DM, HTN and recent right HECTOR 1 month ago at OSH who presented from home to [facility] due to [address] in culture drawn in clinic with DR SCHULTZ [facility]. S/p washout/spacer 10/13/19 by Dr Harsha Little.  OP and OR redness/drainage/swelling   NEUROLOGIC: intact; no sensorimotor deficit, follows commands  PSYCHIATRIC: alert and oriented x 3; affect appropriate    ASSESSMENT/PLAN  Cont pt/ot, f/u Dr Elder Celina 10/29, estimated DC from rehab  IV meropenem EOT 11/25, DC fr

## 2019-10-29 ENCOUNTER — SNF VISIT (OUTPATIENT)
Dept: INTERNAL MEDICINE CLINIC | Facility: SKILLED NURSING FACILITY | Age: 64
End: 2019-10-29

## 2019-10-29 DIAGNOSIS — I10 ESSENTIAL HYPERTENSION: ICD-10-CM

## 2019-10-29 DIAGNOSIS — T84.50XD INFECTION OF PROSTHETIC JOINT, SUBSEQUENT ENCOUNTER: ICD-10-CM

## 2019-10-29 DIAGNOSIS — E11.69 TYPE 2 DIABETES MELLITUS WITH OTHER SPECIFIED COMPLICATION, WITHOUT LONG-TERM CURRENT USE OF INSULIN (HCC): ICD-10-CM

## 2019-10-29 PROCEDURE — 99308 SBSQ NF CARE LOW MDM 20: CPT | Performed by: NURSE PRACTITIONER

## 2019-10-29 NOTE — PROGRESS NOTES
HPI: Stacey Handley  Is a 61 yo female with PMH significant for type 2 DM, HTN and recent right HECTOR 1 month ago at OSH who presented from home to [address] [place] [address] due to [address] in culture drawn in clinic with DR SCHULTZ [facility]. S/p washout/spacer 10/13/19 by Dr Silvana Chavira.  OP and OR redness/drainage/swelling   NEUROLOGIC: intact; no sensorimotor deficit, follows commands  PSYCHIATRIC: alert and oriented x 3; affect appropriate    ASSESSMENT/PLAN  Cont pt/ot, f/u Dr Vitaly Davenport 10/29 today, estimated DC from rehab  IV meropenem EOT 11/25,

## 2019-11-01 ENCOUNTER — SNF VISIT (OUTPATIENT)
Dept: INTERNAL MEDICINE CLINIC | Facility: SKILLED NURSING FACILITY | Age: 64
End: 2019-11-01

## 2019-11-01 DIAGNOSIS — E11.69 TYPE 2 DIABETES MELLITUS WITH OTHER SPECIFIED COMPLICATION, WITHOUT LONG-TERM CURRENT USE OF INSULIN (HCC): ICD-10-CM

## 2019-11-01 DIAGNOSIS — I10 ESSENTIAL HYPERTENSION: ICD-10-CM

## 2019-11-01 DIAGNOSIS — Z96.649 S/P REVISION OF TOTAL HIP: ICD-10-CM

## 2019-11-01 PROCEDURE — 99307 SBSQ NF CARE SF MDM 10: CPT | Performed by: NURSE PRACTITIONER

## 2019-11-01 NOTE — PROGRESS NOTES
HPI: Yulia Thurman  Is a 61 yo female with PMH significant for type 2 DM, HTN and recent right HECTOR 1 month ago at OSH who presented from home to [address] [place] [address] due to [address] in culture drawn in clinic with DR SCHULTZ [facility]. S/p washout/spacer 10/13/19 by Dr Iggy Loera.  OP and OR warm, dry  WOUND: R hip incision intact steri strips no redness/drainage/swelling   NEUROLOGIC: intact; no sensorimotor deficit, follows commands  PSYCHIATRIC: alert and oriented x 3; affect appropriate    ASSESSMENT/PLAN  Continue pt/ot and IV antibiotics

## 2019-11-04 ENCOUNTER — SNF VISIT (OUTPATIENT)
Dept: INTERNAL MEDICINE CLINIC | Facility: SKILLED NURSING FACILITY | Age: 64
End: 2019-11-04

## 2019-11-04 DIAGNOSIS — Z96.641 HISTORY OF TOTAL RIGHT HIP ARTHROPLASTY: ICD-10-CM

## 2019-11-04 DIAGNOSIS — E11.69 TYPE 2 DIABETES MELLITUS WITH OTHER SPECIFIED COMPLICATION, WITHOUT LONG-TERM CURRENT USE OF INSULIN (HCC): ICD-10-CM

## 2019-11-04 DIAGNOSIS — T84.50XD INFECTION OF PROSTHETIC JOINT, SUBSEQUENT ENCOUNTER: ICD-10-CM

## 2019-11-04 PROCEDURE — 99307 SBSQ NF CARE SF MDM 10: CPT | Performed by: NURSE PRACTITIONER

## 2019-11-04 NOTE — PROGRESS NOTES
HPI: Agustina Reyez  Is a 63 yo female with PMH significant for type 2 DM, HTN and recent right HECTOR 1 month ago at OSH who presented from home to [address] [place] [address] due to [address] in culture drawn in clinic with DR SCHULTZ [facility]. S/p washout/spacer 10/13/19 by Dr Ulises Quinones.  OP and OR falling off, no redness/drainage/swelling   NEUROLOGIC: intact; no sensorimotor deficit, follows commands  PSYCHIATRIC: alert and oriented x 3; affect appropriate    ASSESSMENT/PLAN  Continue pt/ot and IV antibiotics, ID following in rehab  IV meropenem EO

## 2019-11-08 ENCOUNTER — SNF VISIT (OUTPATIENT)
Dept: INTERNAL MEDICINE CLINIC | Facility: SKILLED NURSING FACILITY | Age: 64
End: 2019-11-08

## 2019-11-08 DIAGNOSIS — I10 ESSENTIAL HYPERTENSION: ICD-10-CM

## 2019-11-08 DIAGNOSIS — T84.50XD INFECTION OF PROSTHETIC JOINT, SUBSEQUENT ENCOUNTER: ICD-10-CM

## 2019-11-08 DIAGNOSIS — R19.7 DIARRHEA, UNSPECIFIED TYPE: ICD-10-CM

## 2019-11-08 DIAGNOSIS — E11.69 TYPE 2 DIABETES MELLITUS WITH OTHER SPECIFIED COMPLICATION, WITHOUT LONG-TERM CURRENT USE OF INSULIN (HCC): ICD-10-CM

## 2019-11-08 PROCEDURE — 99308 SBSQ NF CARE LOW MDM 20: CPT | Performed by: NURSE PRACTITIONER

## 2019-11-08 NOTE — PROGRESS NOTES
HPI: Violetta Barillas  Is a 61 yo female with PMH significant for type 2 DM, HTN and recent right HECTOR 1 month ago at OSH who presented from home to [address] [place] [address] due to [address] in culture drawn in clinic with DR SCHULTZ [facility]. S/p washout/spacer 10/13/19 by Dr Chiki Ravi.  OP and OR scar tissue, no redness/drainage/swelling   NEUROLOGIC: intact; no sensorimotor deficit, follows commands  PSYCHIATRIC: alert and oriented x 3; affect appropriate    ASSESSMENT/PLAN  On IV meropenem EOT 11/25  2 loose BMs today  florastor bid, stool for cd

## 2019-11-14 ENCOUNTER — SNF VISIT (OUTPATIENT)
Dept: INTERNAL MEDICINE CLINIC | Facility: SKILLED NURSING FACILITY | Age: 64
End: 2019-11-14

## 2019-11-14 DIAGNOSIS — E11.9 TYPE 2 DIABETES MELLITUS WITHOUT COMPLICATION, WITHOUT LONG-TERM CURRENT USE OF INSULIN (HCC): ICD-10-CM

## 2019-11-14 DIAGNOSIS — I10 ESSENTIAL HYPERTENSION: ICD-10-CM

## 2019-11-14 DIAGNOSIS — T84.50XD INFECTION OF PROSTHETIC JOINT, SUBSEQUENT ENCOUNTER: ICD-10-CM

## 2019-11-14 PROCEDURE — 99308 SBSQ NF CARE LOW MDM 20: CPT | Performed by: NURSE PRACTITIONER

## 2019-11-14 NOTE — PROGRESS NOTES
HPI: Violetta Barillas  Is a 63 yo female with PMH significant for type 2 DM, HTN and recent right HECTOR 1 month ago at OSH who presented from home to [address] [place] [address] due to [address] in culture drawn in clinic with DR SCHULTZ [facility]. S/p washout/spacer 10/13/19 by Dr Chiki Ravi.  OP and OR picc intact  SKIN: warm, dry, R hip incision not observed today   NEUROLOGIC: intact; no sensorimotor deficit, follows commands  PSYCHIATRIC: alert and oriented x 3; affect appropriate    ASSESSMENT/PLAN  Cont pt/ot, On IV meropenem EOT 11/25  ID following

## 2019-11-19 ENCOUNTER — SNF VISIT (OUTPATIENT)
Dept: INTERNAL MEDICINE CLINIC | Facility: SKILLED NURSING FACILITY | Age: 64
End: 2019-11-19

## 2019-11-19 DIAGNOSIS — T84.51XD INFECTION ASSOCIATED WITH INTERNAL RIGHT HIP PROSTHESIS, SUBSEQUENT ENCOUNTER: ICD-10-CM

## 2019-11-19 DIAGNOSIS — I10 ESSENTIAL HYPERTENSION: ICD-10-CM

## 2019-11-19 DIAGNOSIS — E11.9 TYPE 2 DIABETES MELLITUS WITHOUT COMPLICATION, WITHOUT LONG-TERM CURRENT USE OF INSULIN (HCC): ICD-10-CM

## 2019-11-19 PROCEDURE — 99308 SBSQ NF CARE LOW MDM 20: CPT | Performed by: NURSE PRACTITIONER

## 2019-11-19 NOTE — PROGRESS NOTES
HPI: Racquel Tsang  Is a 63 yo female with PMH significant for type 2 DM, HTN and recent right HECTOR 1 month ago at OSH who presented from home to [address] [place] [address] due to [address] in culture drawn in clinic with DR SCHULTZ [facility]. S/p washout/spacer 10/13/19 by Dr Dougie Coffey.  OP and OR NEUROLOGIC: intact; no sensorimotor deficit, follows commands  PSYCHIATRIC: alert and oriented x 3; affect appropriate    ASSESSMENT/PLAN  Cont pt/ot, On IV meropenem EOT 11/25  ID following, seen today   She is NWB, scheduled to discharge from rehab 11/

## 2019-11-21 ENCOUNTER — SNF VISIT (OUTPATIENT)
Dept: INTERNAL MEDICINE CLINIC | Facility: SKILLED NURSING FACILITY | Age: 64
End: 2019-11-21

## 2019-11-21 DIAGNOSIS — I10 ESSENTIAL HYPERTENSION: ICD-10-CM

## 2019-11-21 DIAGNOSIS — E11.9 TYPE 2 DIABETES MELLITUS WITHOUT COMPLICATION, WITHOUT LONG-TERM CURRENT USE OF INSULIN (HCC): ICD-10-CM

## 2019-11-21 DIAGNOSIS — T84.50XD INFECTION OF PROSTHETIC JOINT, SUBSEQUENT ENCOUNTER: ICD-10-CM

## 2019-11-21 PROCEDURE — 99307 SBSQ NF CARE SF MDM 10: CPT | Performed by: NURSE PRACTITIONER

## 2019-11-21 NOTE — PROGRESS NOTES
HPI: Louisa Johnson  Is a 63 yo female with PMH significant for type 2 DM, HTN and recent right HECTOR 1 month ago at OSH who presented from home to [address] [place] [address] due to [address] in culture drawn in clinic with DR SCHULTZ [facility]. S/p washout/spacer 10/13/19 by Dr Lynette Arceo.  OP and OR PJI 2:2 Serratia  -s/p 10/13/19 wash out/spacer, OP culture growing Serratia, OR culture also serratia  -NWB to RLE  -PT/OT will need rehab and at least 6 weeks IV antibiotics, meropenem EOT 11/25  -DVT ppx with Lovenox x 3 weeks completed  -ID consult in

## 2019-11-25 ENCOUNTER — SNF DISCHARGE (OUTPATIENT)
Dept: INTERNAL MEDICINE CLINIC | Facility: SKILLED NURSING FACILITY | Age: 64
End: 2019-11-25

## 2019-11-25 DIAGNOSIS — T84.50XD INFECTION OF PROSTHETIC JOINT, SUBSEQUENT ENCOUNTER: ICD-10-CM

## 2019-11-25 PROCEDURE — 99308 SBSQ NF CARE LOW MDM 20: CPT | Performed by: NURSE PRACTITIONER

## 2019-11-25 NOTE — PROGRESS NOTES
HPI: Thomas Duran  Is a 61 yo female with PMH significant for type 2 DM, HTN and recent right HECTOR 1 month ago at OSH who presented from home to [facility] due to [address] in culture drawn in clinic with DR SCHULTZ [facility]. S/p washout/spacer 10/13/19 by Dr Sachi Allen.  OP and OR Serratia  -s/p 10/13/19 wash out/spacer, OP culture growing Serratia, OR culture also serratia  -NWB to RLE  -PT/OT will need rehab and at least 6 weeks IV antibiotics, meropenem EOT 11/25, no further antibiotics ordered by ID  -DVT ppx with Lovenox x 3 we

## 2019-12-27 RX ORDER — AMLODIPINE BESYLATE AND BENAZEPRIL HYDROCHLORIDE 10; 40 MG/1; MG/1
1 CAPSULE ORAL DAILY
COMMUNITY
End: 2020-01-31

## 2020-01-02 NOTE — H&P (VIEW-ONLY)
[facility] Orthopaedics: Adult Reconstruction Clinic Note    CC:  Pre op visit for 2nd stage right hip revision    HPI: Jeff To is a 62year old female who presents today for pre op visit. Planning on reimplantation on Mon.     No acute ill tobacco: Never Used    Alcohol use: No      Alcohol/week: 0.0 standard drinks    Drug use: No      PHYSICAL EXAM:  Estimated body mass index is 27.12 kg/m² as calculated from the following:    Height as of this encounter: 5' 6\" (1.676 m).     Weight as of

## 2020-01-03 ENCOUNTER — LAB ENCOUNTER (OUTPATIENT)
Dept: LAB | Facility: HOSPITAL | Age: 65
End: 2020-01-03
Attending: ORTHOPAEDIC SURGERY
Payer: COMMERCIAL

## 2020-01-03 DIAGNOSIS — Z01.818 PRE-OP TESTING: ICD-10-CM

## 2020-01-03 LAB
ANION GAP SERPL CALC-SCNC: 5 MMOL/L (ref 0–18)
ANTIBODY SCREEN: NEGATIVE
BUN BLD-MCNC: 15 MG/DL (ref 7–18)
BUN/CREAT SERPL: 18.1 (ref 10–20)
CALCIUM BLD-MCNC: 9.4 MG/DL (ref 8.5–10.1)
CHLORIDE SERPL-SCNC: 107 MMOL/L (ref 98–112)
CO2 SERPL-SCNC: 28 MMOL/L (ref 21–32)
CREAT BLD-MCNC: 0.83 MG/DL (ref 0.55–1.02)
GLUCOSE BLD-MCNC: 144 MG/DL (ref 70–99)
MRSA DNA SPEC QL NAA+PROBE: NEGATIVE
OSMOLALITY SERPL CALC.SUM OF ELEC: 293 MOSM/KG (ref 275–295)
POTASSIUM SERPL-SCNC: 4 MMOL/L (ref 3.5–5.1)
RH BLOOD TYPE: NEGATIVE
SODIUM SERPL-SCNC: 140 MMOL/L (ref 136–145)

## 2020-01-03 PROCEDURE — 86900 BLOOD TYPING SEROLOGIC ABO: CPT

## 2020-01-03 PROCEDURE — 36415 COLL VENOUS BLD VENIPUNCTURE: CPT

## 2020-01-03 PROCEDURE — 87641 MR-STAPH DNA AMP PROBE: CPT

## 2020-01-03 PROCEDURE — 80048 BASIC METABOLIC PNL TOTAL CA: CPT

## 2020-01-03 PROCEDURE — 86850 RBC ANTIBODY SCREEN: CPT

## 2020-01-03 PROCEDURE — 86901 BLOOD TYPING SEROLOGIC RH(D): CPT

## 2020-01-03 PROCEDURE — 86920 COMPATIBILITY TEST SPIN: CPT

## 2020-01-06 ENCOUNTER — ANESTHESIA (OUTPATIENT)
Dept: SURGERY | Facility: HOSPITAL | Age: 65
DRG: 467 | End: 2020-01-06
Payer: COMMERCIAL

## 2020-01-06 ENCOUNTER — ANESTHESIA EVENT (OUTPATIENT)
Dept: SURGERY | Facility: HOSPITAL | Age: 65
DRG: 467 | End: 2020-01-06
Payer: COMMERCIAL

## 2020-01-06 ENCOUNTER — HOSPITAL ENCOUNTER (INPATIENT)
Facility: HOSPITAL | Age: 65
LOS: 4 days | Discharge: HOME HEALTH | DRG: 467 | End: 2020-01-10
Attending: ORTHOPAEDIC SURGERY | Admitting: INTERNAL MEDICINE
Payer: COMMERCIAL

## 2020-01-06 ENCOUNTER — APPOINTMENT (OUTPATIENT)
Dept: GENERAL RADIOLOGY | Facility: HOSPITAL | Age: 65
DRG: 467 | End: 2020-01-06
Attending: ORTHOPAEDIC SURGERY
Payer: COMMERCIAL

## 2020-01-06 DIAGNOSIS — T84.51XD INFECTION ASSOCIATED WITH INTERNAL RIGHT HIP PROSTHESIS, SUBSEQUENT ENCOUNTER: ICD-10-CM

## 2020-01-06 DIAGNOSIS — T84.51XD INFLAMMATORY REACTION DUE TO INTERNAL PROSTHESIS OF RIGHT HIP, SUBSEQUENT ENCOUNTER: ICD-10-CM

## 2020-01-06 DIAGNOSIS — Z01.818 PRE-OP TESTING: Primary | ICD-10-CM

## 2020-01-06 LAB
GLUCOSE BLDC GLUCOMTR-MCNC: 104 MG/DL (ref 70–99)
GLUCOSE BLDC GLUCOMTR-MCNC: 188 MG/DL (ref 70–99)
GLUCOSE BLDC GLUCOMTR-MCNC: 227 MG/DL (ref 70–99)
GLUCOSE BLDC GLUCOMTR-MCNC: 249 MG/DL (ref 70–99)
POCT HEMOCUE: 9 (ref 12–16)

## 2020-01-06 PROCEDURE — 0SP908Z REMOVAL OF SPACER FROM RIGHT HIP JOINT, OPEN APPROACH: ICD-10-PCS | Performed by: ORTHOPAEDIC SURGERY

## 2020-01-06 PROCEDURE — 87075 CULTR BACTERIA EXCEPT BLOOD: CPT | Performed by: ORTHOPAEDIC SURGERY

## 2020-01-06 PROCEDURE — 0SR904Z REPLACEMENT OF RIGHT HIP JOINT WITH CERAMIC ON POLYETHYLENE SYNTHETIC SUBSTITUTE, OPEN APPROACH: ICD-10-PCS | Performed by: ORTHOPAEDIC SURGERY

## 2020-01-06 PROCEDURE — P9045 ALBUMIN (HUMAN), 5%, 250 ML: HCPCS | Performed by: ANESTHESIOLOGY

## 2020-01-06 PROCEDURE — 82962 GLUCOSE BLOOD TEST: CPT

## 2020-01-06 PROCEDURE — 73501 X-RAY EXAM HIP UNI 1 VIEW: CPT | Performed by: ORTHOPAEDIC SURGERY

## 2020-01-06 PROCEDURE — 87070 CULTURE OTHR SPECIMN AEROBIC: CPT | Performed by: ORTHOPAEDIC SURGERY

## 2020-01-06 PROCEDURE — 88331 PATH CONSLTJ SURG 1 BLK 1SPC: CPT | Performed by: ORTHOPAEDIC SURGERY

## 2020-01-06 PROCEDURE — 73502 X-RAY EXAM HIP UNI 2-3 VIEWS: CPT | Performed by: ORTHOPAEDIC SURGERY

## 2020-01-06 PROCEDURE — P9045 ALBUMIN (HUMAN), 5%, 250 ML: HCPCS

## 2020-01-06 PROCEDURE — 87176 TISSUE HOMOGENIZATION CULTR: CPT | Performed by: ORTHOPAEDIC SURGERY

## 2020-01-06 PROCEDURE — 87205 SMEAR GRAM STAIN: CPT | Performed by: ORTHOPAEDIC SURGERY

## 2020-01-06 PROCEDURE — 88305 TISSUE EXAM BY PATHOLOGIST: CPT | Performed by: ORTHOPAEDIC SURGERY

## 2020-01-06 DEVICE — STIMULAN® RAPID CURE PROVIDED STERILE FOR SINGLE PATIENT USE. STIMULAN® RAPID CURE CONTAINS CALCIUM SULFATE POWDER AND MIXING SOLUTION IN PRE-MEASURED QUANTITIES SO THAT WHEN MIXED TOGETHER IN A STERILE MIXING BOWL, THE RESULTANT PASTE IS TO BE DIGITALLY PACKED INTO OPEN BONE VOID/GAP TO SET INSITU OR PLACED INTO THE MOULD PROVIDED, THE MIXTURE SETS TO FORM BEADS. THE BIODEGRADABLE, RADIOPAQUE BEADS ARE RESORBED IN APPROXIMATELY 30 – 60 DAYS WHEN USED IN ACCORDANCE WITH THE DEVICE LABELLING. STIMULAN® RAPID CURE IS MANUFACTURED FROM SYNTHETIC IMPLANT GRADE CALCIUM SULFATE DIHYDRATE(CASO4.2H2O) THAT RESORBS AND IS REPLACED WITH BONE DURING THE HEALING PROCESS. ALSO, AS THE BONE VOID FILLER BEADS ARE BIODEGRADABLE AND BIOCOMPATIBLE, THEY MAY BE USED AT AN INFECTED SITE.
Type: IMPLANTABLE DEVICE | Site: HIP | Status: FUNCTIONAL
Brand: STIMULAN® RAPID CURE

## 2020-01-06 RX ORDER — ONDANSETRON 2 MG/ML
INJECTION INTRAMUSCULAR; INTRAVENOUS AS NEEDED
Status: DISCONTINUED | OUTPATIENT
Start: 2020-01-06 | End: 2020-01-06 | Stop reason: SURG

## 2020-01-06 RX ORDER — TRANEXAMIC ACID 10 MG/ML
1000 INJECTION, SOLUTION INTRAVENOUS ONCE
Status: DISCONTINUED | OUTPATIENT
Start: 2020-01-06 | End: 2020-01-06 | Stop reason: HOSPADM

## 2020-01-06 RX ORDER — LIDOCAINE HYDROCHLORIDE 10 MG/ML
INJECTION, SOLUTION EPIDURAL; INFILTRATION; INTRACAUDAL; PERINEURAL AS NEEDED
Status: DISCONTINUED | OUTPATIENT
Start: 2020-01-06 | End: 2020-01-06 | Stop reason: SURG

## 2020-01-06 RX ORDER — MORPHINE SULFATE 10 MG/ML
6 INJECTION, SOLUTION INTRAMUSCULAR; INTRAVENOUS EVERY 10 MIN PRN
Status: DISCONTINUED | OUTPATIENT
Start: 2020-01-06 | End: 2020-01-06 | Stop reason: HOSPADM

## 2020-01-06 RX ORDER — PHENYLEPHRINE HCL 10 MG/ML
VIAL (ML) INJECTION AS NEEDED
Status: DISCONTINUED | OUTPATIENT
Start: 2020-01-06 | End: 2020-01-06 | Stop reason: SURG

## 2020-01-06 RX ORDER — VANCOMYCIN HYDROCHLORIDE 1 G/20ML
INJECTION, POWDER, LYOPHILIZED, FOR SOLUTION INTRAVENOUS AS NEEDED
Status: DISCONTINUED | OUTPATIENT
Start: 2020-01-06 | End: 2020-01-06 | Stop reason: HOSPADM

## 2020-01-06 RX ORDER — ASPIRIN 325 MG
325 TABLET ORAL 2 TIMES DAILY
Status: DISCONTINUED | OUTPATIENT
Start: 2020-01-06 | End: 2020-01-10

## 2020-01-06 RX ORDER — SODIUM CHLORIDE 0.9 % (FLUSH) 0.9 %
10 SYRINGE (ML) INJECTION AS NEEDED
Status: DISCONTINUED | OUTPATIENT
Start: 2020-01-06 | End: 2020-01-10

## 2020-01-06 RX ORDER — TRANEXAMIC ACID 10 MG/ML
1000 INJECTION, SOLUTION INTRAVENOUS ONCE
Status: COMPLETED | OUTPATIENT
Start: 2020-01-06 | End: 2020-01-06

## 2020-01-06 RX ORDER — HALOPERIDOL 5 MG/ML
0.25 INJECTION INTRAMUSCULAR ONCE AS NEEDED
Status: DISCONTINUED | OUTPATIENT
Start: 2020-01-06 | End: 2020-01-06 | Stop reason: HOSPADM

## 2020-01-06 RX ORDER — ASPIRIN 325 MG
325 TABLET, DELAYED RELEASE (ENTERIC COATED) ORAL ONCE
Status: COMPLETED | OUTPATIENT
Start: 2020-01-06 | End: 2020-01-06

## 2020-01-06 RX ORDER — HYDROCODONE BITARTRATE AND ACETAMINOPHEN 10; 325 MG/1; MG/1
1 TABLET ORAL EVERY 4 HOURS PRN
Status: DISCONTINUED | OUTPATIENT
Start: 2020-01-06 | End: 2020-01-10

## 2020-01-06 RX ORDER — PROCHLORPERAZINE EDISYLATE 5 MG/ML
10 INJECTION INTRAMUSCULAR; INTRAVENOUS EVERY 6 HOURS PRN
Status: DISCONTINUED | OUTPATIENT
Start: 2020-01-06 | End: 2020-01-10

## 2020-01-06 RX ORDER — TOBRAMYCIN 1.2 G/30ML
INJECTION, POWDER, LYOPHILIZED, FOR SOLUTION INTRAVENOUS AS NEEDED
Status: DISCONTINUED | OUTPATIENT
Start: 2020-01-06 | End: 2020-01-06 | Stop reason: HOSPADM

## 2020-01-06 RX ORDER — SODIUM CHLORIDE, SODIUM LACTATE, POTASSIUM CHLORIDE, CALCIUM CHLORIDE 600; 310; 30; 20 MG/100ML; MG/100ML; MG/100ML; MG/100ML
INJECTION, SOLUTION INTRAVENOUS CONTINUOUS
Status: DISCONTINUED | OUTPATIENT
Start: 2020-01-06 | End: 2020-01-06 | Stop reason: HOSPADM

## 2020-01-06 RX ORDER — FAMOTIDINE 20 MG/1
20 TABLET ORAL ONCE
Status: DISCONTINUED | OUTPATIENT
Start: 2020-01-06 | End: 2020-01-06 | Stop reason: HOSPADM

## 2020-01-06 RX ORDER — KETOROLAC TROMETHAMINE 15 MG/ML
15 INJECTION, SOLUTION INTRAMUSCULAR; INTRAVENOUS EVERY 8 HOURS SCHEDULED
Status: COMPLETED | OUTPATIENT
Start: 2020-01-07 | End: 2020-01-07

## 2020-01-06 RX ORDER — SENNOSIDES 8.6 MG
17.2 TABLET ORAL NIGHTLY
Status: DISCONTINUED | OUTPATIENT
Start: 2020-01-06 | End: 2020-01-10

## 2020-01-06 RX ORDER — DEXTROSE MONOHYDRATE 25 G/50ML
50 INJECTION, SOLUTION INTRAVENOUS AS NEEDED
Status: DISCONTINUED | OUTPATIENT
Start: 2020-01-06 | End: 2020-01-10

## 2020-01-06 RX ORDER — HYDROMORPHONE HYDROCHLORIDE 1 MG/ML
0.2 INJECTION, SOLUTION INTRAMUSCULAR; INTRAVENOUS; SUBCUTANEOUS EVERY 5 MIN PRN
Status: DISCONTINUED | OUTPATIENT
Start: 2020-01-06 | End: 2020-01-06 | Stop reason: HOSPADM

## 2020-01-06 RX ORDER — HYDROCODONE BITARTRATE AND ACETAMINOPHEN 10; 325 MG/1; MG/1
2 TABLET ORAL EVERY 4 HOURS PRN
Status: DISCONTINUED | OUTPATIENT
Start: 2020-01-06 | End: 2020-01-10

## 2020-01-06 RX ORDER — CEFAZOLIN SODIUM/WATER 2 G/20 ML
2 SYRINGE (ML) INTRAVENOUS EVERY 8 HOURS
Status: COMPLETED | OUTPATIENT
Start: 2020-01-07 | End: 2020-01-07

## 2020-01-06 RX ORDER — SODIUM CHLORIDE, SODIUM LACTATE, POTASSIUM CHLORIDE, CALCIUM CHLORIDE 600; 310; 30; 20 MG/100ML; MG/100ML; MG/100ML; MG/100ML
INJECTION, SOLUTION INTRAVENOUS CONTINUOUS
Status: DISCONTINUED | OUTPATIENT
Start: 2020-01-06 | End: 2020-01-10

## 2020-01-06 RX ORDER — POLYETHYLENE GLYCOL 3350 17 G/17G
17 POWDER, FOR SOLUTION ORAL DAILY PRN
Status: DISCONTINUED | OUTPATIENT
Start: 2020-01-06 | End: 2020-01-10

## 2020-01-06 RX ORDER — DIPHENHYDRAMINE HYDROCHLORIDE 50 MG/ML
12.5 INJECTION INTRAMUSCULAR; INTRAVENOUS EVERY 4 HOURS PRN
Status: DISCONTINUED | OUTPATIENT
Start: 2020-01-06 | End: 2020-01-10

## 2020-01-06 RX ORDER — HYDROMORPHONE HYDROCHLORIDE 1 MG/ML
0.4 INJECTION, SOLUTION INTRAMUSCULAR; INTRAVENOUS; SUBCUTANEOUS EVERY 5 MIN PRN
Status: DISCONTINUED | OUTPATIENT
Start: 2020-01-06 | End: 2020-01-06 | Stop reason: HOSPADM

## 2020-01-06 RX ORDER — METOCLOPRAMIDE 10 MG/1
10 TABLET ORAL ONCE
Status: DISCONTINUED | OUTPATIENT
Start: 2020-01-06 | End: 2020-01-06 | Stop reason: HOSPADM

## 2020-01-06 RX ORDER — SODIUM PHOSPHATE, DIBASIC AND SODIUM PHOSPHATE, MONOBASIC 7; 19 G/133ML; G/133ML
1 ENEMA RECTAL ONCE AS NEEDED
Status: DISCONTINUED | OUTPATIENT
Start: 2020-01-06 | End: 2020-01-10

## 2020-01-06 RX ORDER — BISACODYL 10 MG
10 SUPPOSITORY, RECTAL RECTAL
Status: DISCONTINUED | OUTPATIENT
Start: 2020-01-06 | End: 2020-01-10

## 2020-01-06 RX ORDER — NEOSTIGMINE METHYLSULFATE 0.5 MG/ML
INJECTION INTRAVENOUS AS NEEDED
Status: DISCONTINUED | OUTPATIENT
Start: 2020-01-06 | End: 2020-01-06 | Stop reason: SURG

## 2020-01-06 RX ORDER — OXYCODONE HCL 10 MG/1
10 TABLET, FILM COATED, EXTENDED RELEASE ORAL ONCE
Status: COMPLETED | OUTPATIENT
Start: 2020-01-06 | End: 2020-01-06

## 2020-01-06 RX ORDER — GLYCOPYRROLATE 0.2 MG/ML
INJECTION INTRAMUSCULAR; INTRAVENOUS AS NEEDED
Status: DISCONTINUED | OUTPATIENT
Start: 2020-01-06 | End: 2020-01-06 | Stop reason: SURG

## 2020-01-06 RX ORDER — GABAPENTIN 300 MG/1
300 CAPSULE ORAL NIGHTLY
Status: DISCONTINUED | OUTPATIENT
Start: 2020-01-06 | End: 2020-01-10

## 2020-01-06 RX ORDER — ALBUMIN, HUMAN INJ 5% 5 %
SOLUTION INTRAVENOUS CONTINUOUS PRN
Status: DISCONTINUED | OUTPATIENT
Start: 2020-01-06 | End: 2020-01-06 | Stop reason: SURG

## 2020-01-06 RX ORDER — DOCUSATE SODIUM 100 MG/1
100 CAPSULE, LIQUID FILLED ORAL 2 TIMES DAILY
Status: DISCONTINUED | OUTPATIENT
Start: 2020-01-06 | End: 2020-01-10

## 2020-01-06 RX ORDER — ROCURONIUM BROMIDE 10 MG/ML
INJECTION, SOLUTION INTRAVENOUS AS NEEDED
Status: DISCONTINUED | OUTPATIENT
Start: 2020-01-06 | End: 2020-01-06 | Stop reason: SURG

## 2020-01-06 RX ORDER — BACITRACIN 50000 [USP'U]/1
INJECTION, POWDER, LYOPHILIZED, FOR SOLUTION INTRAMUSCULAR AS NEEDED
Status: DISCONTINUED | OUTPATIENT
Start: 2020-01-06 | End: 2020-01-06 | Stop reason: HOSPADM

## 2020-01-06 RX ORDER — HYDROMORPHONE HYDROCHLORIDE 1 MG/ML
0.6 INJECTION, SOLUTION INTRAMUSCULAR; INTRAVENOUS; SUBCUTANEOUS EVERY 5 MIN PRN
Status: DISCONTINUED | OUTPATIENT
Start: 2020-01-06 | End: 2020-01-06 | Stop reason: HOSPADM

## 2020-01-06 RX ORDER — DIPHENHYDRAMINE HYDROCHLORIDE 50 MG/ML
25 INJECTION INTRAMUSCULAR; INTRAVENOUS ONCE AS NEEDED
Status: ACTIVE | OUTPATIENT
Start: 2020-01-06 | End: 2020-01-06

## 2020-01-06 RX ORDER — ONDANSETRON 2 MG/ML
4 INJECTION INTRAMUSCULAR; INTRAVENOUS ONCE AS NEEDED
Status: COMPLETED | OUTPATIENT
Start: 2020-01-06 | End: 2020-01-06

## 2020-01-06 RX ORDER — INSULIN ASPART 100 [IU]/ML
INJECTION, SOLUTION INTRAVENOUS; SUBCUTANEOUS AS NEEDED
Status: DISCONTINUED | OUTPATIENT
Start: 2020-01-06 | End: 2020-01-06 | Stop reason: SURG

## 2020-01-06 RX ORDER — PROCHLORPERAZINE EDISYLATE 5 MG/ML
5 INJECTION INTRAMUSCULAR; INTRAVENOUS ONCE AS NEEDED
Status: DISCONTINUED | OUTPATIENT
Start: 2020-01-06 | End: 2020-01-06 | Stop reason: HOSPADM

## 2020-01-06 RX ORDER — MORPHINE SULFATE 4 MG/ML
4 INJECTION, SOLUTION INTRAMUSCULAR; INTRAVENOUS EVERY 10 MIN PRN
Status: DISCONTINUED | OUTPATIENT
Start: 2020-01-06 | End: 2020-01-06 | Stop reason: HOSPADM

## 2020-01-06 RX ORDER — ACETAMINOPHEN 500 MG
1000 TABLET ORAL ONCE
Status: DISCONTINUED | OUTPATIENT
Start: 2020-01-06 | End: 2020-01-06

## 2020-01-06 RX ORDER — HYDROCODONE BITARTRATE AND ACETAMINOPHEN 5; 325 MG/1; MG/1
1 TABLET ORAL AS NEEDED
Status: DISCONTINUED | OUTPATIENT
Start: 2020-01-06 | End: 2020-01-06 | Stop reason: HOSPADM

## 2020-01-06 RX ORDER — CELECOXIB 200 MG/1
200 CAPSULE ORAL ONCE
Status: COMPLETED | OUTPATIENT
Start: 2020-01-06 | End: 2020-01-06

## 2020-01-06 RX ORDER — ACETAMINOPHEN 500 MG
1000 TABLET ORAL ONCE
Status: COMPLETED | OUTPATIENT
Start: 2020-01-06 | End: 2020-01-06

## 2020-01-06 RX ORDER — CEFAZOLIN SODIUM/WATER 2 G/20 ML
2 SYRINGE (ML) INTRAVENOUS ONCE
Status: COMPLETED | OUTPATIENT
Start: 2020-01-06 | End: 2020-01-06

## 2020-01-06 RX ORDER — ONDANSETRON 2 MG/ML
4 INJECTION INTRAMUSCULAR; INTRAVENOUS EVERY 4 HOURS PRN
Status: DISCONTINUED | OUTPATIENT
Start: 2020-01-06 | End: 2020-01-10

## 2020-01-06 RX ORDER — HYDROCODONE BITARTRATE AND ACETAMINOPHEN 5; 325 MG/1; MG/1
2 TABLET ORAL AS NEEDED
Status: DISCONTINUED | OUTPATIENT
Start: 2020-01-06 | End: 2020-01-06 | Stop reason: HOSPADM

## 2020-01-06 RX ORDER — MORPHINE SULFATE 4 MG/ML
2 INJECTION, SOLUTION INTRAMUSCULAR; INTRAVENOUS EVERY 10 MIN PRN
Status: DISCONTINUED | OUTPATIENT
Start: 2020-01-06 | End: 2020-01-06 | Stop reason: HOSPADM

## 2020-01-06 RX ORDER — NALOXONE HYDROCHLORIDE 0.4 MG/ML
80 INJECTION, SOLUTION INTRAMUSCULAR; INTRAVENOUS; SUBCUTANEOUS AS NEEDED
Status: DISCONTINUED | OUTPATIENT
Start: 2020-01-06 | End: 2020-01-06 | Stop reason: HOSPADM

## 2020-01-06 RX ORDER — DEXTROSE MONOHYDRATE 25 G/50ML
50 INJECTION, SOLUTION INTRAVENOUS
Status: DISCONTINUED | OUTPATIENT
Start: 2020-01-06 | End: 2020-01-06 | Stop reason: HOSPADM

## 2020-01-06 RX ORDER — DIPHENHYDRAMINE HCL 25 MG
25 CAPSULE ORAL EVERY 4 HOURS PRN
Status: DISCONTINUED | OUTPATIENT
Start: 2020-01-06 | End: 2020-01-10

## 2020-01-06 RX ORDER — ACETAMINOPHEN 325 MG/1
650 TABLET ORAL EVERY 4 HOURS PRN
Status: DISCONTINUED | OUTPATIENT
Start: 2020-01-06 | End: 2020-01-10

## 2020-01-06 RX ORDER — METOCLOPRAMIDE HYDROCHLORIDE 5 MG/ML
10 INJECTION INTRAMUSCULAR; INTRAVENOUS EVERY 6 HOURS PRN
Status: DISCONTINUED | OUTPATIENT
Start: 2020-01-06 | End: 2020-01-10

## 2020-01-06 RX ORDER — ATORVASTATIN CALCIUM 20 MG/1
20 TABLET, FILM COATED ORAL NIGHTLY
Status: DISCONTINUED | OUTPATIENT
Start: 2020-01-06 | End: 2020-01-10

## 2020-01-06 RX ADMIN — PHENYLEPHRINE HCL 200 MCG: 10 MG/ML VIAL (ML) INJECTION at 17:56:00

## 2020-01-06 RX ADMIN — PHENYLEPHRINE HCL 50 MCG: 10 MG/ML VIAL (ML) INJECTION at 17:32:00

## 2020-01-06 RX ADMIN — ROCURONIUM BROMIDE 10 MG: 10 INJECTION, SOLUTION INTRAVENOUS at 17:21:00

## 2020-01-06 RX ADMIN — PHENYLEPHRINE HCL 100 MCG: 10 MG/ML VIAL (ML) INJECTION at 16:12:00

## 2020-01-06 RX ADMIN — ALBUMIN, HUMAN INJ 5%: 5 SOLUTION INTRAVENOUS at 17:56:00

## 2020-01-06 RX ADMIN — LIDOCAINE HYDROCHLORIDE 50 MG: 10 INJECTION, SOLUTION EPIDURAL; INFILTRATION; INTRACAUDAL; PERINEURAL at 14:24:00

## 2020-01-06 RX ADMIN — TRANEXAMIC ACID 1000 MG: 10 INJECTION, SOLUTION INTRAVENOUS at 14:28:00

## 2020-01-06 RX ADMIN — PHENYLEPHRINE HCL 100 MCG: 10 MG/ML VIAL (ML) INJECTION at 15:25:00

## 2020-01-06 RX ADMIN — SODIUM CHLORIDE, SODIUM LACTATE, POTASSIUM CHLORIDE, CALCIUM CHLORIDE: 600; 310; 30; 20 INJECTION, SOLUTION INTRAVENOUS at 14:20:00

## 2020-01-06 RX ADMIN — PHENYLEPHRINE HCL 100 MCG: 10 MG/ML VIAL (ML) INJECTION at 17:40:00

## 2020-01-06 RX ADMIN — GLYCOPYRROLATE 0.6 MG: 0.2 INJECTION INTRAMUSCULAR; INTRAVENOUS at 18:09:00

## 2020-01-06 RX ADMIN — PHENYLEPHRINE HCL 100 MCG: 10 MG/ML VIAL (ML) INJECTION at 16:27:00

## 2020-01-06 RX ADMIN — PHENYLEPHRINE HCL 100 MCG: 10 MG/ML VIAL (ML) INJECTION at 17:09:00

## 2020-01-06 RX ADMIN — INSULIN ASPART 3 UNITS: 100 INJECTION, SOLUTION INTRAVENOUS; SUBCUTANEOUS at 17:50:00

## 2020-01-06 RX ADMIN — ROCURONIUM BROMIDE 35 MG: 10 INJECTION, SOLUTION INTRAVENOUS at 14:24:00

## 2020-01-06 RX ADMIN — NEOSTIGMINE METHYLSULFATE 4 MG: 0.5 INJECTION INTRAVENOUS at 18:09:00

## 2020-01-06 RX ADMIN — PHENYLEPHRINE HCL 50 MCG: 10 MG/ML VIAL (ML) INJECTION at 15:59:00

## 2020-01-06 RX ADMIN — ROCURONIUM BROMIDE 5 MG: 10 INJECTION, SOLUTION INTRAVENOUS at 16:31:00

## 2020-01-06 RX ADMIN — ALBUMIN, HUMAN INJ 5%: 5 SOLUTION INTRAVENOUS at 18:17:00

## 2020-01-06 RX ADMIN — ROCURONIUM BROMIDE 5 MG: 10 INJECTION, SOLUTION INTRAVENOUS at 15:38:00

## 2020-01-06 RX ADMIN — TRANEXAMIC ACID 1000 MG: 10 INJECTION, SOLUTION INTRAVENOUS at 18:03:00

## 2020-01-06 RX ADMIN — CEFAZOLIN SODIUM/WATER 2 G: 2 G/20 ML SYRINGE (ML) INTRAVENOUS at 18:30:00

## 2020-01-06 RX ADMIN — PHENYLEPHRINE HCL 100 MCG: 10 MG/ML VIAL (ML) INJECTION at 17:17:00

## 2020-01-06 RX ADMIN — CEFAZOLIN SODIUM/WATER 2 G: 2 G/20 ML SYRINGE (ML) INTRAVENOUS at 14:40:00

## 2020-01-06 RX ADMIN — SODIUM CHLORIDE, SODIUM LACTATE, POTASSIUM CHLORIDE, CALCIUM CHLORIDE: 600; 310; 30; 20 INJECTION, SOLUTION INTRAVENOUS at 16:01:00

## 2020-01-06 RX ADMIN — ONDANSETRON 4 MG: 2 INJECTION INTRAMUSCULAR; INTRAVENOUS at 18:09:00

## 2020-01-06 RX ADMIN — ROCURONIUM BROMIDE 5 MG: 10 INJECTION, SOLUTION INTRAVENOUS at 16:47:00

## 2020-01-06 RX ADMIN — PHENYLEPHRINE HCL 100 MCG: 10 MG/ML VIAL (ML) INJECTION at 16:51:00

## 2020-01-06 RX ADMIN — PHENYLEPHRINE HCL 100 MCG: 10 MG/ML VIAL (ML) INJECTION at 18:17:00

## 2020-01-06 RX ADMIN — PHENYLEPHRINE HCL 50 MCG: 10 MG/ML VIAL (ML) INJECTION at 15:44:00

## 2020-01-06 RX ADMIN — SODIUM CHLORIDE, SODIUM LACTATE, POTASSIUM CHLORIDE, CALCIUM CHLORIDE: 600; 310; 30; 20 INJECTION, SOLUTION INTRAVENOUS at 16:00:00

## 2020-01-06 RX ADMIN — SODIUM CHLORIDE, SODIUM LACTATE, POTASSIUM CHLORIDE, CALCIUM CHLORIDE: 600; 310; 30; 20 INJECTION, SOLUTION INTRAVENOUS at 17:09:00

## 2020-01-06 RX ADMIN — PHENYLEPHRINE HCL 100 MCG: 10 MG/ML VIAL (ML) INJECTION at 16:59:00

## 2020-01-06 RX ADMIN — SODIUM CHLORIDE, SODIUM LACTATE, POTASSIUM CHLORIDE, CALCIUM CHLORIDE: 600; 310; 30; 20 INJECTION, SOLUTION INTRAVENOUS at 18:53:00

## 2020-01-06 NOTE — INTERVAL H&P NOTE
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

## 2020-01-06 NOTE — ANESTHESIA PREPROCEDURE EVALUATION
Anesthesia PreOp Note    HPI:     Javi Donald is a 62year old female who presents for preoperative consultation requested by: Sebastian Jacobo MD    Date of Surgery: 1/6/2020    Procedure(s):  HIP TOTAL ARTHROPLASTY REVISION  Indication: Inflammatory Take 1 capsule by mouth daily. , Disp: , Rfl: , 1/5/2020 at 1800  MetFORMIN HCl  MG Oral Tablet 24 Hr, Take two tablets twice a day with meals.  (Patient taking differently: Take 500 mg by mouth daily with breakfast. Take two tablets twice a day with m Socioeconomic History      Marital status:       Spouse name: Not on file      Number of children: Not on file      Years of education: Not on file      Highest education level: Not on file    Occupational History      Not on file    Social Needs 01/03/2020    BUN 15.0 12/20/2019    CREATSERUM 0.83 01/03/2020    CREATSERUM 0.87 12/20/2019     (H) 01/03/2020     (H) 12/20/2019    PGLU 104 (H) 01/06/2020    CA 9.4 01/03/2020          Vital Signs: Body mass index is 27.04 kg/m².    alexsandra

## 2020-01-06 NOTE — ANESTHESIA PROCEDURE NOTES
Airway  Urgency: Elective      General Information and Staff    Patient location during procedure: OR  Anesthesiologist: Betty Gtz DO  Performed: anesthesiologist     Indications and Patient Condition  Indications for airway management: anesthesia  Se

## 2020-01-07 LAB
ANION GAP SERPL CALC-SCNC: 6 MMOL/L (ref 0–18)
ANTIBODY SCREEN: NEGATIVE
BLOOD TYPE BARCODE: 1700
BUN BLD-MCNC: 22 MG/DL (ref 7–18)
BUN/CREAT SERPL: 18.8 (ref 10–20)
CALCIUM BLD-MCNC: 7.8 MG/DL (ref 8.5–10.1)
CHLORIDE SERPL-SCNC: 109 MMOL/L (ref 98–112)
CO2 SERPL-SCNC: 26 MMOL/L (ref 21–32)
CREAT BLD-MCNC: 1.17 MG/DL (ref 0.55–1.02)
DEPRECATED RDW RBC AUTO: 46 FL (ref 35.1–46.3)
ERYTHROCYTE [DISTWIDTH] IN BLOOD BY AUTOMATED COUNT: 14.1 % (ref 11–15)
GLUCOSE BLD-MCNC: 169 MG/DL (ref 70–99)
GLUCOSE BLDC GLUCOMTR-MCNC: 141 MG/DL (ref 70–99)
GLUCOSE BLDC GLUCOMTR-MCNC: 150 MG/DL (ref 70–99)
GLUCOSE BLDC GLUCOMTR-MCNC: 179 MG/DL (ref 70–99)
GLUCOSE BLDC GLUCOMTR-MCNC: 194 MG/DL (ref 70–99)
HCT VFR BLD AUTO: 17.5 % (ref 35–48)
HCT VFR BLD AUTO: 20.4 % (ref 35–48)
HCT VFR BLD AUTO: 23 % (ref 35–48)
HGB BLD-MCNC: 5.8 G/DL (ref 12–16)
HGB BLD-MCNC: 6.8 G/DL (ref 12–16)
HGB BLD-MCNC: 7.7 G/DL (ref 12–16)
MCH RBC QN AUTO: 29.6 PG (ref 26–34)
MCHC RBC AUTO-ENTMCNC: 33.1 G/DL (ref 31–37)
MCV RBC AUTO: 89.3 FL (ref 80–100)
OSMOLALITY SERPL CALC.SUM OF ELEC: 299 MOSM/KG (ref 275–295)
PLATELET # BLD AUTO: 141 10(3)UL (ref 150–450)
POTASSIUM SERPL-SCNC: 4.9 MMOL/L (ref 3.5–5.1)
RBC # BLD AUTO: 1.96 X10(6)UL (ref 3.8–5.3)
RH BLOOD TYPE: NEGATIVE
SODIUM SERPL-SCNC: 141 MMOL/L (ref 136–145)
WBC # BLD AUTO: 7.8 X10(3) UL (ref 4–11)

## 2020-01-07 PROCEDURE — 86920 COMPATIBILITY TEST SPIN: CPT

## 2020-01-07 PROCEDURE — 36430 TRANSFUSION BLD/BLD COMPNT: CPT

## 2020-01-07 PROCEDURE — 85027 COMPLETE CBC AUTOMATED: CPT | Performed by: ORTHOPAEDIC SURGERY

## 2020-01-07 PROCEDURE — 86901 BLOOD TYPING SEROLOGIC RH(D): CPT | Performed by: INTERNAL MEDICINE

## 2020-01-07 PROCEDURE — 80048 BASIC METABOLIC PNL TOTAL CA: CPT | Performed by: ORTHOPAEDIC SURGERY

## 2020-01-07 PROCEDURE — 30233N1 TRANSFUSION OF NONAUTOLOGOUS RED BLOOD CELLS INTO PERIPHERAL VEIN, PERCUTANEOUS APPROACH: ICD-10-PCS | Performed by: INTERNAL MEDICINE

## 2020-01-07 PROCEDURE — 86900 BLOOD TYPING SEROLOGIC ABO: CPT | Performed by: INTERNAL MEDICINE

## 2020-01-07 PROCEDURE — 85014 HEMATOCRIT: CPT | Performed by: INTERNAL MEDICINE

## 2020-01-07 PROCEDURE — 86850 RBC ANTIBODY SCREEN: CPT | Performed by: INTERNAL MEDICINE

## 2020-01-07 PROCEDURE — 82962 GLUCOSE BLOOD TEST: CPT

## 2020-01-07 PROCEDURE — 85018 HEMOGLOBIN: CPT | Performed by: INTERNAL MEDICINE

## 2020-01-07 RX ORDER — SODIUM CHLORIDE 9 MG/ML
INJECTION, SOLUTION INTRAVENOUS ONCE
Status: COMPLETED | OUTPATIENT
Start: 2020-01-07 | End: 2020-01-07

## 2020-01-07 RX ORDER — SODIUM CHLORIDE 9 MG/ML
INJECTION, SOLUTION INTRAVENOUS ONCE
Status: DISCONTINUED | OUTPATIENT
Start: 2020-01-07 | End: 2020-01-10

## 2020-01-07 RX ORDER — KETOROLAC TROMETHAMINE 15 MG/ML
INJECTION, SOLUTION INTRAMUSCULAR; INTRAVENOUS
Status: DISPENSED
Start: 2020-01-07 | End: 2020-01-08

## 2020-01-07 NOTE — H&P
[facility] Hospitalist H&P     CC: right hip revision     PCP: Hardy Farah MD    Admission Date: 1/6/2020    ASSESSMENT / PLAN:   Lul Thomas a 62year old female with PMH sig for type 2 DM, HTN, s/p right hip arthroplasty 9/2019 who developed post op joint overall good.        PMH  Past Medical History:   Diagnosis Date   • Diabetes [facility])    • Essential hypertension    • High blood pressure    • Uncontrolled type 2 diabetes mellitus with hyperglycemia ([facility] [address] 9/2/2015        PSH  Past Surgical History:   Proced non-distended, +BS  MSK: moving all extremities, right hip with drain in place  Neuro: Grossly normal, CN intact, sensory intact  Psych: Affect- normal  SKIN: warm, dry  EXT: no edema    Diagnostic Data:    CBC/Chem    Recent Labs   Lab 01/07/20  0602   RB

## 2020-01-07 NOTE — PHYSICAL THERAPY NOTE
2nd attempt for PT evaluation- however, Hgb post transfusion 6.8. Per rehab protocol PT intervention deferred if Hgb <7.0.  Will reschedule patient for 1/8/20  Thank you,  Katalina Bravo, PT, DPT

## 2020-01-07 NOTE — OCCUPATIONAL THERAPY NOTE
Attempted to see pt for OT evaluation, however pt with 6.8 hgb after blood transfusion. Will continue to follow.

## 2020-01-07 NOTE — PHYSICAL THERAPY NOTE
Attempted PT evaluation- hold this a.m. due to low Hgb: 5.8. Will re-attempt this p.m. as able. RN, Paris Galarza, aware.   Thank you,  Jodie Mejia, PT, DPT

## 2020-01-07 NOTE — OPERATIVE REPORT
Patient Name:  Brian Hines  :  1955  DATE OF SURGERY:  2020     ATTENDING:  Rolando Rizo MD     SURGEON:  Rolando Rizo MD     PREOPERATIVE DIAGNOSIS:  1.  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 The fascia was incised. The posterior soft  tissues were not intact. The sciatic nerve was palpated and protected  throughout the procedure. We did not encounter any purulence.   We removed the two cables and mayco selected the neutral ceramic femoral head. This was impacted onto a  clean, dry trunnion. The hip was reduced. We had excellent stability with final construct.   We could flex the  knee to 90 degrees and internally rotate 70 degrees without any  poster

## 2020-01-07 NOTE — CONSULTS
[facility] Infectious Disease  Report of Consultation    Wendy Welsh Patient Status:  Inpatient    3/7/1955 MRN [id number]   Location [facility] 4W/SW/SE Attending Davian De Guzman MD   Hosp Day # 1 PCP Wesley Mcallister MD     Date of Admi lactated ringers infusion, , Intravenous, Continuous  •  Normal Saline Flush 0.9 % injection 10 mL, 10 mL, Intravenous, PRN  •  sodium chloride 0.9% IV bolus 500 mL, 500 mL, Intravenous, Once PRN  •  gabapentin (NEURONTIN) cap 300 mg, 300 mg, Oral, Nightly changes. HEENT:  No visual changes, oral ulcers, sore throat, difficulty swallowing. Respiratory: Negative for cough, sputum, hemoptysis, chest pain, wheezing, dyspnea on exertion, or stridor.    Cardiovascular: Negative for chest pain, palpitations, ir — 88 14 100 %   01/06/20 1940 104/71 — — 88 13 100 %   01/06/20 1930 98/60 — — 85 15 100 %   01/06/20 1920 101/67 — — 83 13 96 %   01/06/20 1910 102/64 — — 90 19 97 %   01/06/20 1900 97/71 97.6 °F (36.4 °C) Temporal 93 14 100 %       Intake/Output:  I/O th IV meropenem as Rx. Anticipate a transition to ciprofloxacin 500mg p.o. BID x 14 days at discharge if OR cultures remain negative as expected. D/w patient and IM. Will follow. Diana Villegas Apgar  [facility] Infectious Disease  [phone]

## 2020-01-07 NOTE — PROGRESS NOTES
[facility] Pharmacy Note:  Renal Adjustment for meropenem (MERREM)    Therese Clinton is a 62year old female who has been prescribed meropenem (MERREM) 500 mg every 8 hrs. CrCl is estimated creatinine clearance is 46.1 mL/min (A) (based on SCr of 1.17 mg/dL (H)).

## 2020-01-07 NOTE — OCCUPATIONAL THERAPY NOTE
Attempted to see pt for OT evaluation, however pt's hgb at 5.8. Per protocol, will hold at this time and will re-attempt at a later time. PRATEEK Parker aware.

## 2020-01-07 NOTE — PROGRESS NOTES
[facility]    Therese Clinton Patient Status:  Inpatient    3/7/1955 MRN [id number]   Location [facility] 4W/SW/SE Attending Mila Mendez MD   Hosp Day # 1 PCP Gurpreet Lua MD     SUBJECTIVE:  Interval History: Patient has no c

## 2020-01-07 NOTE — ANESTHESIA POSTPROCEDURE EVALUATION
Patient: Son Josue    Procedure Summary     Date:  01/06/20 Room / Location:  [address] [place] [address] / [address] [place] [address] MAIN OR    Anesthesia Start:  8679 Anesthesia Stop:  1900    Procedure:  HIP TOTAL ARTHROPLASTY REVISION (Right Hip) Diagnosis:       Inflammatory reacti

## 2020-01-07 NOTE — PLAN OF CARE
Problem: Patient Centered Care  Goal: Patient preferences are identified and integrated in the patient's plan of care  Description  Interventions:  - What would you like us to know as we care for you?  HAD MULTIPLE SURGERIES  - Provide timely, complete, a Problem: PAIN - ADULT  Goal: Verbalizes/displays adequate comfort level or patient's stated pain goal  Description  INTERVENTIONS:  - Encourage pt to monitor pain and request assistance  - Assess pain using appropriate pain scale  - Administer analgesics DISCHARGE PLANNING  Goal: Discharge to home or other facility with appropriate resources  Description  INTERVENTIONS:  - Identify barriers to discharge w/pt and caregiver  - Include patient/family/discharge partner in discharge planning  - Arrange for need

## 2020-01-07 NOTE — CM/SW NOTE
SRI received MDO to discuss discharge planning with pt. SRI met with pt in pt's room. SW confirmed pt's address. Pt lives in a 1 level apartment with spouse and children. There are 0  steps into the home and 0  steps to the bedrooms.  Pt states having a hx o

## 2020-01-08 LAB
BLOOD TYPE BARCODE: 1700
BLOOD TYPE BARCODE: 1700
DEPRECATED RDW RBC AUTO: 46.8 FL (ref 35.1–46.3)
ERYTHROCYTE [DISTWIDTH] IN BLOOD BY AUTOMATED COUNT: 14.7 % (ref 11–15)
GLUCOSE BLDC GLUCOMTR-MCNC: 155 MG/DL (ref 70–99)
GLUCOSE BLDC GLUCOMTR-MCNC: 165 MG/DL (ref 70–99)
GLUCOSE BLDC GLUCOMTR-MCNC: 175 MG/DL (ref 70–99)
GLUCOSE BLDC GLUCOMTR-MCNC: 191 MG/DL (ref 70–99)
HCT VFR BLD AUTO: 22.9 % (ref 35–48)
HCT VFR BLD AUTO: 24.7 % (ref 35–48)
HGB BLD-MCNC: 7.6 G/DL (ref 12–16)
HGB BLD-MCNC: 8.2 G/DL (ref 12–16)
MCH RBC QN AUTO: 29.1 PG (ref 26–34)
MCHC RBC AUTO-ENTMCNC: 33.2 G/DL (ref 31–37)
MCV RBC AUTO: 87.7 FL (ref 80–100)
PLATELET # BLD AUTO: 104 10(3)UL (ref 150–450)
RBC # BLD AUTO: 2.61 X10(6)UL (ref 3.8–5.3)
WBC # BLD AUTO: 6 X10(3) UL (ref 4–11)

## 2020-01-08 PROCEDURE — 85014 HEMATOCRIT: CPT | Performed by: INTERNAL MEDICINE

## 2020-01-08 PROCEDURE — 97166 OT EVAL MOD COMPLEX 45 MIN: CPT

## 2020-01-08 PROCEDURE — 85018 HEMOGLOBIN: CPT | Performed by: INTERNAL MEDICINE

## 2020-01-08 PROCEDURE — 97116 GAIT TRAINING THERAPY: CPT

## 2020-01-08 PROCEDURE — 97530 THERAPEUTIC ACTIVITIES: CPT

## 2020-01-08 PROCEDURE — 85027 COMPLETE CBC AUTOMATED: CPT | Performed by: ORTHOPAEDIC SURGERY

## 2020-01-08 PROCEDURE — 97162 PT EVAL MOD COMPLEX 30 MIN: CPT

## 2020-01-08 PROCEDURE — 82962 GLUCOSE BLOOD TEST: CPT

## 2020-01-08 PROCEDURE — 97110 THERAPEUTIC EXERCISES: CPT

## 2020-01-08 RX ORDER — MELATONIN
325
Status: DISCONTINUED | OUTPATIENT
Start: 2020-01-09 | End: 2020-01-10

## 2020-01-08 NOTE — PHYSICAL THERAPY NOTE
PT evaluation orders received. Spoke to [facility] [facility], Per [place]. Therapist will need specification on orders on weight bearing status for RLE.  Per MD note: \"Protected weight bearing\"  Thank you,  Candice Ghotra, PT, DPT

## 2020-01-08 NOTE — PHYSICAL THERAPY NOTE
PHYSICAL THERAPY HIP TREATMENT NOTE - INPATIENT    Room Number: 407/407-A            Presenting Problem: R HECTOR- posterior.  I&D and removal of spacer    Problem List  Active Problems:    Pre-op testing      PHYSICAL THERAPY ASSESSMENT     Patient received s 5  Location: R hip- increased with movement  Management Techniques: Activity promotion; Body mechanics;Repositioning    BALANCE  Static Sitting: Normal  Dynamic Sitting: Good  Static Standing: Fair +  Dynamic Standing: Fair  ACTIVITY TOLERANCE  Pulse: 107 Goal #2 Patient is able to demonstrate transfers Sit to/from Stand at assistance level: modified independent      Goal #2  Current Status  CG   Goal #3 Patient is able to ambulate 300 feet with assistive device at assistance level: Supervision   Goal #3

## 2020-01-08 NOTE — PROGRESS NOTES
[facility] Hospitalist Progress Note     Reason for Admission: right hip revision  PCP: Rommel Dozier MD     Assessment/Plan:     Active Problems:    Pre-op testing    Sriram Sarmiento a 62year old female with PMH sig for type 2 DM, HTN, s/p right hip arthroplasty 9 2079.17 ml   Output 2400 ml   Net -320.83 ml       Last 3 Weights  01/06/20 1241 : 167 lb 8 oz (76 kg)  12/27/19 1043 : 168 lb (76.2 kg)  12/30/19 0956 : 168 lb (76.2 kg)  12/09/19 1206 : 166 lb (75.3 kg)      Exam   GEN: NAD   HEENT: EOMI, PERRLA  Neck: S shaft periprosthetic fracture right hip arthroplasty in progress. OTHER: Negative.     Dictated by (CST): Tricia Medeiros MD on 1/06/2020 at 23:38     Approved by (CST): Tricia Medeiros MD on 1/06/2020 at 23:41              Meds:     • meropenem  500 mg Intr

## 2020-01-08 NOTE — PLAN OF CARE
Patient has remained free from falls throughout stay. Hourly rounding maintained. Pt's bed in lowest position w/ side rails up. Patient has been educated and is compliant w/ call light system.  Patient is a check void by 9pm. Patient is up with 1 assist a Progressing  Goal: Patient/Family Short Term Goal  Description  Patient's Short Term Goal: Pain control prior to my discharge date.      Interventions:   -Develop pain management plan with RN  -Encourage ambulation  -Ice therapy PRN  -distraction       Outc Discharge to home or other facility with appropriate resources  Description  INTERVENTIONS:  - Identify barriers to discharge w/pt and caregiver  - Include patient/family/discharge partner in discharge planning  - Arrange for needed discharge resources and

## 2020-01-08 NOTE — CM/SW NOTE
SRI reviewed PT and OTs recommendation for Home with [place]. Per conversation with the pt, she would like a referral sent to [facility]. SW sent referral to [facility] through [place].      [facility] orders and F2F entered    PLAN: Home with [address]

## 2020-01-08 NOTE — PROGRESS NOTES
[facility] AND CLINICS  Progress Note    Se Elizabeth Patient Status:  Inpatient    3/7/1955 MRN [id number]   Location [facility] 4W/SW/SE Attending Brenda Panda MD   Hosp Day # 2 PCP Gamal Soto MD     SUBJECTIVE:  Interval History: Worked w PT.  Ashish Lu

## 2020-01-08 NOTE — PHYSICAL THERAPY NOTE
PHYSICAL THERAPY HIP EVALUATION - INPATIENT     Room Number: 407/407-A  Evaluation Date: 1/8/2020  Type of Evaluation: Initial  Physician Order: PT Eval and Treat    Presenting Problem: R HECTOR- posterior.  I&D and removal of spacer  Reason for Therapy: Mobil functional outcomes. Patient will benefit from continued IP PT services to address these deficits in preparation for discharge. DISCHARGE RECOMMENDATIONS  PT Discharge Recommendations: Home with home health PT; Intermittent Supervision    PLAN  PT Esvin ASSESSMENT  Ratin(7 out of 10 with movement)  Location: R hip- increased with movement  Management Techniques: Activity promotion; Body mechanics;Repositioning    COGNITION  · Overall Cognitive Status:  WFL - within functional limits    RANGE OF MOTION Exercise/Education Provided:  Bed mobility  Body mechanics  Energy conservation  Functional activity tolerated  Gait training  Posture  Strengthening  Lower therapeutic exercise:   Ankle pumps  Quad sets  Transfer training    Patient End of Session: Up

## 2020-01-08 NOTE — PROGRESS NOTES
[facility] Infectious Disease Progress Note    Zak Rosales Patient Status:  Inpatient    3/7/1955 MRN [id number]   Location [facility] 4W/SW/SE Attending Alberto Carson MD   Hosp Day # 2 PCP Chalino Guerrero MD     Subjective:  Pt states h glucose (DEX4) oral liquid 15 g, 15 g, Oral, Q15 Min PRN  •  Insulin Aspart Pen (NOVOLOG) 100 UNIT/ML flexpen 1-7 Units, 1-7 Units, Subcutaneous, TID CC and HS  •  Prochlorperazine Edisylate (COMPAZINE) injection 10 mg, 10 mg, Intravenous, Q6H PRN  •  lact which completed on 11/25  -s/p reimplantation of hardware on 1/6, OR cultures NGTD  -on IV meropenem  2. Dispo  -anticipate d/c on PO cipro if OR cultures remain negative    If you have any questions or concerns please call [facility] at [phone].      HUB

## 2020-01-08 NOTE — OCCUPATIONAL THERAPY NOTE
OCCUPATIONAL THERAPY EVALUATION - INPATIENT      Room Number: 407/407-A  Evaluation Date: 1/8/2020  Type of Evaluation: Initial       Physician Order: IP Consult to Occupational Therapy  Reason for Therapy: ADL/IADL Dysfunction and Discharge Planning    OC been calculated based on documentation in the [facility] '6 clicks' Inpatient Daily Activity Short Form. Research supports that patients with this level of impairment may benefit from [facility].      DISCHARGE RECOMMENDATIONS  OT Discharge Recommendations: Home with risk    WEIGHT BEARING RESTRICTION  Weight Bearing Restriction: R lower extremity        R Lower Extremity: Weight Bearing as Tolerated       PAIN ASSESSMENT  Ratin  Location: R hip  Management Techniques: Activity promotion; Body mechanics;Repositionin promotion, compensatory strategies -- hip precautions     Patient End of Session: Up in chair;Needs met;Call light within reach;RN aware of session/findings    OT Goals  Patient self-stated goal is: home      Patient will complete LE dressing with Mod I  C

## 2020-01-09 LAB
ANION GAP SERPL CALC-SCNC: 3 MMOL/L (ref 0–18)
BUN BLD-MCNC: 20 MG/DL (ref 7–18)
BUN/CREAT SERPL: 23.5 (ref 10–20)
CALCIUM BLD-MCNC: 9.5 MG/DL (ref 8.5–10.1)
CHLORIDE SERPL-SCNC: 105 MMOL/L (ref 98–112)
CO2 SERPL-SCNC: 31 MMOL/L (ref 21–32)
CREAT BLD-MCNC: 0.85 MG/DL (ref 0.55–1.02)
DEPRECATED RDW RBC AUTO: 45.9 FL (ref 35.1–46.3)
ERYTHROCYTE [DISTWIDTH] IN BLOOD BY AUTOMATED COUNT: 14.3 % (ref 11–15)
GLUCOSE BLD-MCNC: 161 MG/DL (ref 70–99)
GLUCOSE BLDC GLUCOMTR-MCNC: 154 MG/DL (ref 70–99)
GLUCOSE BLDC GLUCOMTR-MCNC: 165 MG/DL (ref 70–99)
GLUCOSE BLDC GLUCOMTR-MCNC: 189 MG/DL (ref 70–99)
GLUCOSE BLDC GLUCOMTR-MCNC: 191 MG/DL (ref 70–99)
HCT VFR BLD AUTO: 22.7 % (ref 35–48)
HGB BLD-MCNC: 7.6 G/DL (ref 12–16)
MCH RBC QN AUTO: 29.6 PG (ref 26–34)
MCHC RBC AUTO-ENTMCNC: 33.5 G/DL (ref 31–37)
MCV RBC AUTO: 88.3 FL (ref 80–100)
OSMOLALITY SERPL CALC.SUM OF ELEC: 294 MOSM/KG (ref 275–295)
PLATELET # BLD AUTO: 131 10(3)UL (ref 150–450)
POTASSIUM SERPL-SCNC: 4.2 MMOL/L (ref 3.5–5.1)
RBC # BLD AUTO: 2.57 X10(6)UL (ref 3.8–5.3)
SODIUM SERPL-SCNC: 139 MMOL/L (ref 136–145)
WBC # BLD AUTO: 6.3 X10(3) UL (ref 4–11)

## 2020-01-09 PROCEDURE — 97530 THERAPEUTIC ACTIVITIES: CPT

## 2020-01-09 PROCEDURE — 82962 GLUCOSE BLOOD TEST: CPT

## 2020-01-09 PROCEDURE — 85027 COMPLETE CBC AUTOMATED: CPT | Performed by: ORTHOPAEDIC SURGERY

## 2020-01-09 PROCEDURE — 80048 BASIC METABOLIC PNL TOTAL CA: CPT | Performed by: INTERNAL MEDICINE

## 2020-01-09 PROCEDURE — 97116 GAIT TRAINING THERAPY: CPT

## 2020-01-09 PROCEDURE — 97110 THERAPEUTIC EXERCISES: CPT

## 2020-01-09 RX ORDER — AMLODIPINE BESYLATE 10 MG/1
10 TABLET ORAL DAILY
Status: DISCONTINUED | OUTPATIENT
Start: 2020-01-10 | End: 2020-01-10

## 2020-01-09 NOTE — PROGRESS NOTES
[facility] Infectious Disease Progress Note    Neo Lerner Patient Status:  Inpatient    3/7/1955 MRN [id number]   Location [facility] 4W/SW/SE Attending Adalid Patel MD   Hosp Day # 3 PCP Wendy Jacobo MD     Subjective:  Patient see mL, 50 mL, Intravenous, PRN  •  Glucose-Vitamin C (DEX-4) chewable tab 4 tablet, 4 tablet, Oral, Q15 Min PRN  •  glucose (DEX4) oral liquid 15 g, 15 g, Oral, Q15 Min PRN  •  Insulin Aspart Pen (NOVOLOG) 100 UNIT/ML flexpen 1-7 Units, 1-7 Units, Subcutaneou Assessment/Plan:    1.   Complicated post-op hip infection now s/p 2nd stage procedure  - s/p R hip THR at [facility]  9/2019  - s/p outpatient aspiration on 10/10 - cultures with serratia  - s/p OR with explantation of hardware and spacer placement in

## 2020-01-09 NOTE — PLAN OF CARE
Comments: Pt alert. R/A. Vitals monitored. Voiding of urine. OOBx1a/walker. Abductor pillow when in bed. IVP merrem hung per order. Surgical aquacell over site is c/d/I. Norco for pain management. Plan to d/c to home/HHC when stable.    Problem: Patient Naseem goal  Description  INTERVENTIONS:  - Encourage pt to monitor pain and request assistance  - Assess pain using appropriate pain scale  - Administer analgesics based on type and severity of pain and evaluate response  - Implement non-pharmacological measures to assist at discharge as needed  - Consider post-discharge preferences of patient/family/discharge partner  - Complete POLST form as appropriate  - Assess patient's ability to be responsible for managing their own health  - Refer to Case Management Depart

## 2020-01-09 NOTE — PHYSICAL THERAPY NOTE
PHYSICAL THERAPY HIP TREATMENT NOTE - INPATIENT    Room Number: 407/407-A            Presenting Problem: R THR (posterior); I&D and removal of spacer    Problem List  Active Problems:    Pre-op testing      PHYSICAL THERAPY ASSESSMENT   Pt was seen for bot hip  Management Techniques: Activity promotion; Body mechanics; Relaxation;Repositioning(received pain meds prior to session)    BALANCE  Static Sitting: Good  Dynamic Sitting: Fair +  Static Standing: Fair  Dynamic Standin Vicky Morales 2 '6-Clicks' INPA Current Status  Supervision   Goal #2 Patient is able to demonstrate transfers Sit to/from Stand at assistance level: modified independent      Goal #2  Current Status SBA   Goal #3 Patient is able to ambulate 300 feet with assistive device at assistance

## 2020-01-09 NOTE — PROGRESS NOTES
[facility] Hospitalist Progress Note     Reason for Admission: right hip revision  PCP: Kayode Brunson MD     Assessment/Plan:     Active Problems:    Pre-op testing    Zarina Noble a 62year old female with PMH sig for type 2 DM, HTN, s/p right hip arthroplasty 9 1485 ml   Net -935 ml       Last 3 Weights  01/06/20 1241 : 167 lb 8 oz (76 kg)  12/27/19 1043 : 168 lb (76.2 kg)  12/30/19 0956 : 168 lb (76.2 kg)  12/09/19 1206 : 166 lb (75.3 kg)      Exam   GEN: NAD   HEENT: EOMI, PERRLA  Neck: Supple, no JVD  Pulm: CT Right (cpt=73502)    Result Date: 1/6/2020  CONCLUSION:   BONES: Cerclage cables are placed around a nondisplaced proximal femoral shaft periprosthetic fracture right hip arthroplasty in progress. OTHER: Negative.     Dictated by (CST): Jessica Medina MD o

## 2020-01-10 VITALS
DIASTOLIC BLOOD PRESSURE: 88 MMHG | HEART RATE: 102 BPM | HEIGHT: 66 IN | SYSTOLIC BLOOD PRESSURE: 138 MMHG | BODY MASS INDEX: 26.92 KG/M2 | OXYGEN SATURATION: 95 % | WEIGHT: 167.5 LBS | TEMPERATURE: 99 F | RESPIRATION RATE: 16 BRPM

## 2020-01-10 LAB
ANION GAP SERPL CALC-SCNC: 3 MMOL/L (ref 0–18)
BUN BLD-MCNC: 23 MG/DL (ref 7–18)
BUN/CREAT SERPL: 27.4 (ref 10–20)
CALCIUM BLD-MCNC: 8.9 MG/DL (ref 8.5–10.1)
CHLORIDE SERPL-SCNC: 107 MMOL/L (ref 98–112)
CO2 SERPL-SCNC: 29 MMOL/L (ref 21–32)
CREAT BLD-MCNC: 0.84 MG/DL (ref 0.55–1.02)
DEPRECATED RDW RBC AUTO: 43.9 FL (ref 35.1–46.3)
ERYTHROCYTE [DISTWIDTH] IN BLOOD BY AUTOMATED COUNT: 14 % (ref 11–15)
GLUCOSE BLD-MCNC: 148 MG/DL (ref 70–99)
GLUCOSE BLDC GLUCOMTR-MCNC: 163 MG/DL (ref 70–99)
HCT VFR BLD AUTO: 22.3 % (ref 35–48)
HGB BLD-MCNC: 7.4 G/DL (ref 12–16)
MCH RBC QN AUTO: 28.9 PG (ref 26–34)
MCHC RBC AUTO-ENTMCNC: 33.2 G/DL (ref 31–37)
MCV RBC AUTO: 87.1 FL (ref 80–100)
OSMOLALITY SERPL CALC.SUM OF ELEC: 294 MOSM/KG (ref 275–295)
PLATELET # BLD AUTO: 143 10(3)UL (ref 150–450)
POTASSIUM SERPL-SCNC: 4.2 MMOL/L (ref 3.5–5.1)
RBC # BLD AUTO: 2.56 X10(6)UL (ref 3.8–5.3)
SODIUM SERPL-SCNC: 139 MMOL/L (ref 136–145)
WBC # BLD AUTO: 5.4 X10(3) UL (ref 4–11)

## 2020-01-10 PROCEDURE — 85027 COMPLETE CBC AUTOMATED: CPT | Performed by: INTERNAL MEDICINE

## 2020-01-10 PROCEDURE — 80048 BASIC METABOLIC PNL TOTAL CA: CPT | Performed by: INTERNAL MEDICINE

## 2020-01-10 PROCEDURE — 82962 GLUCOSE BLOOD TEST: CPT

## 2020-01-10 PROCEDURE — 97530 THERAPEUTIC ACTIVITIES: CPT

## 2020-01-10 PROCEDURE — 97116 GAIT TRAINING THERAPY: CPT

## 2020-01-10 RX ORDER — CIPROFLOXACIN 500 MG/1
500 TABLET, FILM COATED ORAL EVERY 12 HOURS
Status: DISCONTINUED | OUTPATIENT
Start: 2020-01-10 | End: 2020-01-10

## 2020-01-10 RX ORDER — HYDROCODONE BITARTRATE AND ACETAMINOPHEN 10; 325 MG/1; MG/1
1 TABLET ORAL EVERY 8 HOURS PRN
Qty: 15 TABLET | Refills: 0 | Status: SHIPPED | OUTPATIENT
Start: 2020-01-10 | End: 2020-01-15

## 2020-01-10 RX ORDER — CIPROFLOXACIN 500 MG/1
500 TABLET, FILM COATED ORAL EVERY 12 HOURS SCHEDULED
Qty: 28 TABLET | Refills: 0 | Status: SHIPPED | OUTPATIENT
Start: 2020-01-10 | End: 2020-01-24

## 2020-01-10 RX ORDER — ASPIRIN 325 MG
325 TABLET ORAL 2 TIMES DAILY
Qty: 86 TABLET | Refills: 0 | Status: SHIPPED | OUTPATIENT
Start: 2020-01-10 | End: 2020-02-22

## 2020-01-10 NOTE — PHYSICAL THERAPY NOTE
PHYSICAL THERAPY HIP TREATMENT NOTE - INPATIENT    Room Number: 407/407-A            Presenting Problem: R THR-posterior, I&D and removal of spacer    Problem List  Active Problems:    Pre-op testing      PHYSICAL THERAPY ASSESSMENT   Pt was seen for vidal Standing: Fair      AM-PAC '6-Clicks' INPATIENT SHORT FORM - BASIC MOBILITY  How much difficulty does the patient currently have. ..  -   Turning over in bed (including adjusting bedclothes, sheets and blankets)?: A Little   -   Sitting down on and standing ambulate 300 feet with assistive device at assistance level: Supervision   Goal #3   Current Status X80' with RW, supervision   Goal #4 Patient will negotiate 3 stairs/one curb w/ assistive device and supervision   Goal #4   Current Status Up/down 3 stair

## 2020-01-10 NOTE — PROGRESS NOTES
[facility] AND CLINICS  Progress Note    Christina Sheikh Patient Status:  Inpatient    3/7/1955 MRN [id number]   Location [facility] 4W/SW/SE Attending Quin Al MD   Hosp Day # 4 PCP Idalia Raya MD     SUBJECTIVE:  Interval History: Worked w PT.  [facility]

## 2020-01-10 NOTE — DISCHARGE SUMMARY
General Medicine Discharge Summary     Patient ID:  Trenton Gould  62year old  3/7/1955    Admit date: 1/6/2020    Discharge date and time: 1/10/20    Attending Physician: Shellie Crigler, MD     Consults: IP CONSULT TO INFECTIOUS DISEASE  IP CONSULT  [address] · how to take this  · when to take this        CONTINUE taking these medications    * ACCU-CHEK FASTCLIX LANCETS Misc  Check BS bid     * ACCU-CHEK FASTCLIX LANCETS Misc  Test BS bid     * ACCU-CHEK GUIDE Strp  test blood sugar twice a day     * ACCU-CHEK

## 2020-01-10 NOTE — PROGRESS NOTES
[facility] AND [facility] Infectious Disease Progress Note    Se Elizabeth Patient Status:  Inpatient    3/7/1955 MRN [id number]   Location [facility] 4W/SW/SE Attending Brenda Panda MD   Hosp Day # 4 PCP Gamal Soto MD     Subjective:  Pt states h % injection 50 mL, 50 mL, Intravenous, PRN  •  Glucose-Vitamin C (DEX-4) chewable tab 4 tablet, 4 tablet, Oral, Q15 Min PRN  •  glucose (DEX4) oral liquid 15 g, 15 g, Oral, Q15 Min PRN  •  Insulin Aspart Pen (NOVOLOG) 100 UNIT/ML flexpen 1-7 Units, 1-7 Uni hip infection s/p 2nd stage procedure  -s/p R hip replacement at [facility] ~ 1 month ago  -s/p outpatient aspiration on 10/10, cultures with serratia  -hip xray with soft tissue swelling  -s/p washout and removal of hardware with spacer placement on 10/1

## 2020-01-10 NOTE — PLAN OF CARE
Patient has remained free from falls throughout stay. Hourly rounding maintained. Pt's bed in lowest position w/ side rails up. Patient has been educated and is compliant w/ call light system. Pt voiding. Patient up with 1 and walker.  [place] pain is con Goal  Description  Patient's Short Term Goal: Pain control prior to my discharge date.      Interventions:   -Develop pain management plan with RN  -Encourage ambulation  -Ice therapy PRN  -distraction       Outcome: Progressing     Problem: PAIN - ADULT  G appropriate resources  Description  INTERVENTIONS:  - Identify barriers to discharge w/pt and caregiver  - Include patient/family/discharge partner in discharge planning  - Arrange for needed discharge resources and transportation as appropriate  - Identif

## 2020-01-30 PROBLEM — R29.898 WEAKNESS OF RIGHT HIP: Status: ACTIVE | Noted: 2020-01-30

## 2021-08-13 PROBLEM — R80.9 TYPE 2 DIABETES MELLITUS WITH MICROALBUMINURIA, WITHOUT LONG-TERM CURRENT USE OF INSULIN (HCC): Status: ACTIVE | Noted: 2021-08-13

## 2021-08-13 PROBLEM — E11.65 UNCONTROLLED TYPE 2 DIABETES MELLITUS WITH HYPERGLYCEMIA (HCC): Status: ACTIVE | Noted: 2021-08-13

## 2021-08-13 PROBLEM — E11.36 TYPE 2 DIABETES MELLITUS WITH DIABETIC CATARACT, WITHOUT LONG-TERM CURRENT USE OF INSULIN (HCC): Status: ACTIVE | Noted: 2021-08-13

## 2021-08-13 PROBLEM — E11.29 TYPE 2 DIABETES MELLITUS WITH MICROALBUMINURIA, WITHOUT LONG-TERM CURRENT USE OF INSULIN (HCC): Status: ACTIVE | Noted: 2021-08-13

## 2021-12-10 PROBLEM — T81.40XA POSTOPERATIVE INFECTION: Status: RESOLVED | Noted: 2019-10-11 | Resolved: 2021-12-10

## 2021-12-10 PROBLEM — T81.40XA POSTOPERATIVE INFECTION: Status: ACTIVE | Noted: 2019-10-11

## 2021-12-10 PROBLEM — Z96.641 HISTORY OF TOTAL RIGHT HIP ARTHROPLASTY: Status: RESOLVED | Noted: 2019-10-11 | Resolved: 2021-12-10

## 2021-12-10 PROBLEM — E78.5 HYPERLIPIDEMIA: Status: ACTIVE | Noted: 2018-02-08

## 2021-12-10 PROBLEM — Z01.818 PRE-OP TESTING: Status: RESOLVED | Noted: 2020-01-06 | Resolved: 2021-12-10

## 2022-12-22 NOTE — CM/SW NOTE
SRI notified Carney Aschoff from [facility] AND CLINIC on pts discharge home today per RN. HH orders and F2F entered. SW/CM to remain available for support and/or discharge planning. PLAN: Home with Jeanne [address] [place] [address], [place] ext.  [phone] [Please see my note below.] : Please see my note below. [FreeTextEntry2] : Deadavonte WEBSTER  [FreeTextEntry1] : Thank you for allowing me to participate in the care of Katherine Reich .\par Please see the attached visit note.\par \par \par \par Dionte Packer\par Otology\par Medical Director of [facility]\par Department of Otolaryngology\par [facility]

## (undated) DEVICE — 3M™ IOBAN™ 2 ANTIMICROBIAL INCISE DRAPE 6651EZ: Brand: IOBAN™ 2

## (undated) DEVICE — ENCORE® LATEX MICRO SIZE 8, STERILE LATEX POWDER-FREE SURGICAL GLOVE: Brand: ENCORE

## (undated) DEVICE — IMPLANTABLE DEVICE
Type: IMPLANTABLE DEVICE | Site: HIP | Status: NON-FUNCTIONAL
Removed: 2019-10-13

## (undated) DEVICE — ENCORE® LATEX ACCLAIM SIZE 8.5, STERILE LATEX POWDER-FREE SURGICAL GLOVE: Brand: ENCORE

## (undated) DEVICE — FEMORAL CANAL PRESSURIZER WITHOUT HUB, MEDIUM

## (undated) DEVICE — PILLOW ABDUCTION HIP MED

## (undated) DEVICE — SUTURE MONOCRYL 3-0 Y497G

## (undated) DEVICE — Device

## (undated) DEVICE — 3M™ TEGADERM™ TRANSPARENT FILM DRESSING, 1626W, 4 IN X 4-3/4 IN (10 CM X 12 CM), 50 EACH/CARTON, 4 CARTON/CASE: Brand: 3M™ TEGADERM™

## (undated) DEVICE — DRAPE CASSETTE X-RAY

## (undated) DEVICE — CONTAINER SPEC STR 4OZ GRY LID

## (undated) DEVICE — PEN: MARKING STD PT 100/CS: Brand: MEDICAL ACTION INDUSTRIES

## (undated) DEVICE — NEEDLE SPINAL 20X3-1/2 YELLOW

## (undated) DEVICE — SUTURE ETHILON 3-0 669H

## (undated) DEVICE — DRAPE SRG 70X60IN SPLT U IMPRV

## (undated) DEVICE — STRATAFIX

## (undated) DEVICE — HIGH CAPACITY INTRAMEDULLARY BRUSH TIP: Brand: PULSAVAC®

## (undated) DEVICE — SHEET,DRAPE,70X100,STERILE: Brand: MEDLINE

## (undated) DEVICE — SUTURE ETHIBOND 5-0 MB46G

## (undated) DEVICE — SOL  .9 3000ML

## (undated) DEVICE — 60 ML SYRINGE LUER-LOCK TIP: Brand: MONOJECT

## (undated) DEVICE — TRAY FOLEY BDX 16F STATLOCK

## (undated) DEVICE — OCCLUSIVE GAUZE STRIP,3% BISMUTH TRIBROMOPHENATE IN PETROLATUM BLEND: Brand: XEROFORM

## (undated) DEVICE — DRESSING BORDER AQUACEL 4X10

## (undated) DEVICE — FAN SPRAY KIT: Brand: PULSAVAC®

## (undated) DEVICE — PACK CDS TOTAL HIP

## (undated) DEVICE — SOL  .9 1000ML BTL

## (undated) DEVICE — 3M™ COBAN™ NL STERILE NON-LATEX SELF-ADHERENT WRAP, 2084S, 4 IN X 5 YD (10 CM X 4,5 M), 18 ROLLS/CASE: Brand: 3M™ COBAN™

## (undated) DEVICE — SPONGE LAP 18X18 XRAY STRL

## (undated) DEVICE — KIT DRN 3/16IN PVC DRN 3 SPRG

## (undated) DEVICE — PILLOW FX 56X38X15CM MED HIP

## (undated) DEVICE — 2T8 #2 PDO 24 X 24: Brand: 2T8 #2 PDO 24 X 24

## (undated) DEVICE — T5 HOOD WITH PEEL AWAY FACE SHIELD

## (undated) DEVICE — SUCTION CANISTER, 3000CC,SAFELINER: Brand: DEROYAL

## (undated) DEVICE — ENCORE® LATEX MICRO SIZE 7.5, STERILE LATEX POWDER-FREE SURGICAL GLOVE: Brand: ENCORE

## (undated) DEVICE — CHLORAPREP 26ML APPLICATOR

## (undated) DEVICE — GAUZE SPONGES,12 PLY: Brand: CURITY

## (undated) DEVICE — GOWN SURG AERO BLUE PERF XLG

## (undated) DEVICE — SOL  .9 1000ML BAG

## (undated) DEVICE — 1010 S-DRAPE TOWEL DRAPE 10/BX: Brand: STERI-DRAPE™

## (undated) DEVICE — SUTURE MONOCRYL 2-0 Y945H

## (undated) DEVICE — 3M™ STERI-DRAPE™ INSTRUMENT POUCH 1018: Brand: STERI-DRAPE™

## (undated) DEVICE — HOOD, PEEL-AWAY: Brand: FLYTE

## (undated) DEVICE — SUTURE PDS II 2-0 CT-2

## (undated) DEVICE — BATTERY

## (undated) DEVICE — SUTURE PDS II 2-0 CP

## (undated) DEVICE — 3M™ MEDITPORE™ SOFT CLOTH TAPE 6 IN X 10 YD 12 ROLLS/CASE 2966: Brand: 3M™ MEDIPORE™

## (undated) DEVICE — KIT DRN 1/8IN PVC 3 SPRG EVAC

## (undated) DEVICE — DRAPE SHEET LG

## (undated) DEVICE — 3M™ STERI-DRAPE™ U-DRAPE 1015: Brand: STERI-DRAPE™

## (undated) DEVICE — 3M™ STERI-STRIP™ REINFORCED ADHESIVE SKIN CLOSURES, R1547, 1/2 IN X 4 IN (12 MM X 100 MM), 6 STRIPS/ENVELOPE: Brand: 3M™ STERI-STRIP™

## (undated) DEVICE — TRAY SKIN PREP PVP-1

## (undated) DEVICE — DRESSING 10X4IN ANMC SAFETAC

## (undated) DEVICE — CEMENT MIXING SYSTEM WITH FEMORAL BREAKWAY NOZZLE: Brand: REVOLUTION

## (undated) DEVICE — DEV STRATAFIX MNCRL + SUTR 2-0

## (undated) DEVICE — BLADE ELECTRODE: Brand: EDGE

## (undated) DEVICE — TONGUE DEPRESSOR 6IN STR

## (undated) NOTE — IP AVS SNAPSHOT
[facility]            (For Outpatient Use Only) Initial Admit Date: 10/11/2019   Inpt/Obs Admit Date: Inpt: 10/11/19 / Obs: N/A   Discharge Date:    Radha Carbajal:  [de-identified]   MRN: [de-identified]   CSN: [id number]   CEID: [id number]        E Hospital Account Financial Class: [facility]    October 17, 2019

## (undated) NOTE — IP AVS SNAPSHOT
Patient Demographics     Address  [address] [place] [id number] Phone  [phone] [facility])  [phone] (Mobile) *Preferred* E-mail Address  [email or web address]      Emergency Contact(s)     Name Relation Home Work Mobile    Ivett Bower [address] [place] [address] Contact information:  [address] [id number] [phone]                  Your medication list      TAKE these medications       Instructions Authorizing Provider Morning Afternoon Evening As Needed   ACCU-CHEK FASTCLIX HYDROcodone-acetaminophen  MG Tabs  sodium chloride 0.9% SOLN 100 mL with Meropenem 500 MG SOLR 500 mg     Information about where to get these medications is not yet available    Ask your nurse or doctor about these medications  docusate sodium 100 Body Fluid Cult Aerobic and Anaerobic Once [[id number]]  (Abnormal)  (Susceptibility) Collected:  10/12/19 1324    Order Status:  Completed Lab Status:  Preliminary result Updated:  10/17/19 1219    Specimen:   Body fluid, unspecified from Synovial fluid,hi Anaerobic Culture [[id number]] Collected:  10/13/19 1526    Order Status:  Completed Lab Status:  Preliminary result Updated:  10/16/19 2925    Specimen:  Tissue from Hip, right      Anaerobic Culture No Anaerobes to date    Anaerobic Culture [[id number]] C Order Status:  Completed Lab Status:  Final result Updated:  10/15/19 1539    Specimen:  Tissue from Hip, right      Tissue Culture Result 2+ growth Serratia marcescens     Tissue Smear 2+ WBCs seen      No organisms seen    Tissue Aerobic Culture [[id number] A comprehensive 10 point review of systems was completed and reviewed from the patient intake forms and electronic medical record. Pertinent positives and negatives noted in the the HPI. Constitutional: Negative for appetite change.    HENT: Negative for Pulmonary/Chest: Effort normal. No respiratory distress. Musculoskeletal:Pt exhibits no edema. Neurological: Pt is alert. Coordination normal.   Skin: Skin is warm and dry. Pt is not diaphoretic. Psychiatric: Pt has a normal mood and affect.  Pt behav -     CefTRIAXone Sodium (ROCEPHIN) 1 g in sodium chloride 0.9% 100 mL MBP/ADD-vantage  -     Vancomycin HCl (VANCOCIN) 2,000 mg in sodium chloride 0.9% 500 mL IVPB  -     XR ASPIR/INJ MAJOR JOINT W/FLUORO RT(CPT=77002/93267);  Standing  -     VITAL SIGNS; -     PATIENT EDUCATION; Standing  -     PROVIDE EDUCATIONAL MATERIAL; Standing  -     ACCUCHECK; Standing  -     ACCUCHECK; Standing  -     dextrose 50 % injection 50 mL  -     Glucose-Vitamin C (DEX-4) chewable tab 4 tablet  -     glucose (DEX4) oral liq to come here and is now admitted. No fever is noted, but she did have sweats last evening. No other GI, , cardiovascular, CNS, or respiratory symptoms noted. PAST MEDICAL HISTORY:  Diabetes.       MEDICATIONS:  She has been started on ceftriaxone a Attribution Collins    ES. 1 - Breanna Odell MD on 10/11/2019  9:10 PM                     D/C Summary    No notes of this type exist for this encounter.         Physical Therapy Notes (last 72 hours) (Notes from 10/14/2019  3:25 PM through 10/17/2019  3:25 Mobility Short Form. Research supports that patients with this level of impairment may benefit from LTAC, however pt will benefit from amirah to attain pt's prior level of function. Avril Band     DISCHARGE RECOMMENDATIONS[JAYSHREE.1]  PT Discharge Recommendations: Sub-acut Assistive Device: Rolling walker  Pattern: (nwb right le)  Stoop/Curb Assistance: Not tested[JAYSHREE.2]       Additional Information: senthil hep    Exercises AM Session PM Session   Ankle Pumps     20 reps    Quad Sets 10 reps    Glut Sets 0 reps    Hip Abd/Add 0 4:43 PM  Version 1 of 1    Author:  Raeann Fraser PT Service:  — Author Type:  Physical Therapist    Filed:  10/15/2019  4:43 PM Date of Service:  10/15/2019 11:04 AM Status:  Signed    :  Raeann Fraser PT (Physical Therapist)       Winsome 95 training;Range of motion;Strengthening;Transfer training;Balance training[JAYSHREE.2]    SUBJECTIVE  I am afraid of falling    OBJECTIVE[JAYSHREE.1]  Precautions: HECTOR - posterior;Hip abduction pillow(70 degs hip flexion per chart, strict post precautions)[JAYSHREE.2]    DESIRAE Quad Sets 15 reps    Glut Sets 0 reps    Hip Abd/Add 0 reps    Heel slides 0 reps    Saq 0 reps    Sitting Knee Extension 15 reps    Standing heel/toe raises 0 reps    Hamstring Curls 0 reps    Forward, back steps 0 reps    Short Squats 0 reps[JAYSHREE.1]      P Signed    :  Willy Villanueva (Occupational Therapy Assistant)       OCCUPATIONAL THERAPY TREATMENT NOTE - INPATIENT    Room Number: 436/436-A         Presenting Problem: (postop infection )     Problem List  Principal Problem:    Postoperative infec OBJECTIVE  Precautions: HECTOR - posterior    WEIGHT BEARING RESTRICTION  Weight Bearing Restriction: R lower extremity        R Lower Extremity: Non-Weight Bearing       PAIN ASSESSMENT  Rating: Unable to rate  Location: (RLE)  Management Techniques: Relaxat  Patient will complete LE dressing with min A using AE PRN  Comment: max assist, discussed use of LH AE     Patient will complete toilet transfer with min A   Comment: mod assist x2, with use of rolling commode chair, 70 degree bend limitation on RLE, NWB supports that patients with this level of impairment may benefit from SHANELLE .     DISCHARGE RECOMMENDATIONS  OT Discharge Recommendations: Sub-acute rehabilitation  OT Device Recommendations: Reacher;Sock aid;Long-handled shoehorn;Long-handled sponge    PLAN Patient will independently recall 3 of 3 posterior hip precautions  Comment: Progressing      Comment:            Goals  on: 10/21/19  Frequency:3-5x week    [address] Vladislav Akhtar, OTR/L   [facility]. 1]         A Research supports that patients with this level of impairment may benefit from Sub-acute rehab.       DISCHARGE RECOMMENDATIONS  OT Discharge Recommendations: 24 hour care/supervision;Sub-acute rehabilitation  OT Device Recommendations: TBD    PLAN  OT Esvin Static Sitting: Good  Dynamic Sitting: Fair  Static Standing: Fair-  Dynamic Standing: Fair-    FUNCTIONAL ADL ASSESSMENT  Grooming: NTt  Toileting: NT  Upper Body Dressing: Nt  Lower Body Dressing: Max assist socks    Education Provided: Bed mobility, tra